# Patient Record
Sex: FEMALE | Race: WHITE | Employment: OTHER | URBAN - METROPOLITAN AREA
[De-identification: names, ages, dates, MRNs, and addresses within clinical notes are randomized per-mention and may not be internally consistent; named-entity substitution may affect disease eponyms.]

---

## 2017-07-08 ENCOUNTER — APPOINTMENT (OUTPATIENT)
Dept: CT IMAGING | Age: 62
End: 2017-07-08
Attending: NURSE PRACTITIONER
Payer: COMMERCIAL

## 2017-07-08 ENCOUNTER — HOSPITAL ENCOUNTER (EMERGENCY)
Age: 62
Discharge: HOME | End: 2017-07-08
Attending: EMERGENCY MEDICINE
Payer: COMMERCIAL

## 2017-07-08 ENCOUNTER — APPOINTMENT (OUTPATIENT)
Dept: GENERAL RADIOLOGY | Age: 62
End: 2017-07-08
Attending: NURSE PRACTITIONER
Payer: COMMERCIAL

## 2017-07-08 VITALS
OXYGEN SATURATION: 99 % | DIASTOLIC BLOOD PRESSURE: 76 MMHG | BODY MASS INDEX: 35.5 KG/M2 | HEART RATE: 102 BPM | HEIGHT: 61 IN | TEMPERATURE: 98.3 F | SYSTOLIC BLOOD PRESSURE: 169 MMHG | RESPIRATION RATE: 18 BRPM | WEIGHT: 188 LBS
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
## 2017-07-08 DIAGNOSIS — S09.90XA HEAD INJURY, INITIAL ENCOUNTER: ICD-10-CM

## 2017-07-08 DIAGNOSIS — M25.522 ELBOW PAIN, LEFT: ICD-10-CM

## 2017-07-08 DIAGNOSIS — S01.81XA LACERATION OF FACE, INITIAL ENCOUNTER: ICD-10-CM

## 2017-07-08 PROCEDURE — 74011250637 HC RX REV CODE- 250/637: Performed by: NURSE PRACTITIONER

## 2017-07-08 PROCEDURE — 70450 CT HEAD/BRAIN W/O DYE: CPT

## 2017-07-08 PROCEDURE — 77030031132 HC SUT NYL COVD -A

## 2017-07-08 PROCEDURE — 75810000293 HC SIMP/SUPERF WND  RPR

## 2017-07-08 PROCEDURE — 74011000250 HC RX REV CODE- 250: Performed by: NURSE PRACTITIONER

## 2017-07-08 PROCEDURE — 73080 X-RAY EXAM OF ELBOW: CPT

## 2017-07-08 PROCEDURE — 77030018836 HC SOL IRR NACL ICUM -A

## 2017-07-08 PROCEDURE — 99284 EMERGENCY DEPT VISIT MOD MDM: CPT

## 2017-07-08 RX ORDER — MUPIROCIN 20 MG/G
OINTMENT TOPICAL 3 TIMES DAILY
Qty: 22 G | Refills: 0 | Status: SHIPPED | OUTPATIENT
Start: 2017-07-08 | End: 2017-07-18

## 2017-07-08 RX ORDER — IBUPROFEN 600 MG/1
600 TABLET ORAL
Status: COMPLETED | OUTPATIENT
Start: 2017-07-08 | End: 2017-07-08

## 2017-07-08 RX ORDER — LIDOCAINE HYDROCHLORIDE AND EPINEPHRINE 10; 10 MG/ML; UG/ML
1.5 INJECTION, SOLUTION INFILTRATION; PERINEURAL
Status: COMPLETED | OUTPATIENT
Start: 2017-07-08 | End: 2017-07-08

## 2017-07-08 RX ORDER — BACITRACIN 500 UNIT/G
1 PACKET (EA) TOPICAL 3 TIMES DAILY
Status: DISCONTINUED | OUTPATIENT
Start: 2017-07-08 | End: 2017-07-08 | Stop reason: HOSPADM

## 2017-07-08 RX ADMIN — IBUPROFEN 600 MG: 600 TABLET, FILM COATED ORAL at 15:56

## 2017-07-08 RX ADMIN — BACITRACIN 1 PACKET: 500 OINTMENT TOPICAL at 15:55

## 2017-07-08 RX ADMIN — LIDOCAINE HYDROCHLORIDE,EPINEPHRINE BITARTRATE 15 MG: 10; .01 INJECTION, SOLUTION INFILTRATION; PERINEURAL at 14:42

## 2017-07-08 NOTE — ED PROVIDER NOTES
HPI Comments: 58 yo F with a hx of DM, hydrocephalus, HTN, IBS (see list)  presents to ED via EMS for evaluation after fall. The pt states she tripped and fell forward, striking the left side of her head while attempting to carry a cat in a carrier and her dog on a leash. Pt states she has a hx of hydrocephalus which causes her balance to be off and falls secondary to balance issues. She denies LOC. Denies dizziness, CP prior to fall. Small laceration to L brow, + L elbow pain. Denies neck, back, hip pain or pain in other extremities. There has been no treatment PTA. Past Medical History:  No date: Adverse effect of anesthesia      Comment: SENSITIVE TO NARCOTICS  No date: Anxiety  No date: Arthritis  No date: Asthma  No date: Chronic pain  No date: Diabetes (HCC)  No date: GERD (gastroesophageal reflux disease)  No date: H pylori ulcer  No date: Hemorrhoids  No date: HSV (herpes simplex virus) anogenital infection      Comment: oral  No date: Hydrocephalus  No date: Hypertension  No date: IBS (irritable bowel syndrome)  No date: Ill-defined condition      Comment: HYDRACEPHALIC, NO SHUNT    Social History    Marital status:              Spouse name:                       Years of education:                 Number of children: 1             Occupational History  Occupation          Employer            Comment               nurse                                       Social History Main Topics    Smoking status: Former Smoker                                                                Packs/day: 0.50      Years: 4.00           Types: Cigarettes       Quit date: 1/1/1975    Smokeless status: Never Used                        Alcohol use: Yes                Comment: 3 PER WEEK    Drug use: No              Sexual activity: Yes               Partners with: Male    Other Topics            Concern    None on file    Social History Narrative    None on file          The history is provided by the patient. No  was used. Past Medical History:   Diagnosis Date    Adverse effect of anesthesia     SENSITIVE TO NARCOTICS    Anxiety     Arthritis     Asthma     Chronic pain     Diabetes ([facility] [address]     GERD (gastroesophageal reflux disease)     H pylori ulcer     Hemorrhoids     HSV (herpes simplex virus) anogenital infection     oral    Hydrocephalus     Hypertension     IBS (irritable bowel syndrome)     Ill-defined condition     HYDRACEPHALIC, NO SHUNT       Past Surgical History:   Procedure Laterality Date    APPENDECTOMY      COLONOSCOPY  2004    repeat in 10 years    DELIVERY       DEXA BONE DENSITY STUDY AXIAL  2009    HX CATARACT REMOVAL      bilateral    HX GYN      HYSTERECTOMY    HX GYN      ECTOPIC    HX GYN      C SECTION    HX HEENT      CRYO SURGERY    HX KNEE ARTHROSCOPY  ;     bilateral    HX TOTAL ABDOMINAL HYSTERECTOMY      PAULINE MAMMOGRAM SCREEN BILAT      TREAT ECTOPIC PREG,ABD PREG  1988         Family History:   Problem Relation Age of Onset    High Cholesterol Mother     Heart Disease Mother      MI age 64    Hypertension Father     Depression Father     Heart Failure Father     Alcohol abuse Father     Heart Disease Father [de-identified]     CHF    Anesth Problems Neg Hx        Social History     Social History    Marital status:      Spouse name: N/A    Number of children: 1    Years of education: N/A     Occupational History    nurse      Social History Main Topics    Smoking status: Former Smoker     Packs/day: 0.50     Years: 4.00     Types: Cigarettes     Quit date: 1975    Smokeless tobacco: Never Used    Alcohol use Yes      Comment: 3 PER WEEK    Drug use: No    Sexual activity: Yes     Partners: Male     Other Topics Concern    Not on file     Social History Narrative         ALLERGIES: Lipitor [atorvastatin];  Parafon forte; Paxil [paroxetine hcl]; and Adhesive    Review of Systems   Constitutional: Negative for activity change, appetite change, chills and fever. HENT: Negative for ear discharge and facial swelling. Eyes: Negative for discharge and redness. Respiratory: Negative for chest tightness, shortness of breath and wheezing. Cardiovascular: Negative for chest pain, palpitations and leg swelling. Gastrointestinal: Negative for abdominal pain, diarrhea, nausea, rectal pain and vomiting. Genitourinary: Negative for difficulty urinating, hematuria and urgency. Musculoskeletal: Negative for back pain, joint swelling, neck pain and neck stiffness. L elbow discomfort   Skin: Positive for wound. Negative for rash. Neurological: Negative for seizures, speech difficulty, weakness and headaches. Psychiatric/Behavioral: Negative for confusion. Vitals:    07/08/17 1323   BP: 169/76   Pulse: (!) 102   Resp: 18   Temp: 98.3 °F (36.8 °C)   SpO2: 99%   Weight: 85.3 kg (188 lb)   Height: 5' 1\" (1.549 m)            Physical Exam   Constitutional: She is oriented to person, place, and time. She appears well-developed and well-nourished. No distress. HENT:   Head: Normocephalic and atraumatic. Eyes: Conjunctivae and EOM are normal. Pupils are equal, round, and reactive to light. Neck: Normal range of motion. Neck supple. Cardiovascular: Normal rate, regular rhythm, normal heart sounds and intact distal pulses. Pulmonary/Chest: Effort normal and breath sounds normal.   Abdominal: Soft. Bowel sounds are normal. She exhibits no distension and no mass. There is no tenderness. There is no rebound and no guarding. Musculoskeletal: Normal range of motion. She exhibits tenderness. She exhibits no edema or deformity. Tenderness to palpation of L elbow. No deformity, decrease in sensation or perfusion noted. +2 RP on L. Cap refill less than 3 seconds. No humeral tenderness or FA tenderness.   No tenderness to L hand or wrist.    Neurological: She is alert and oriented to person, place, and time. No cranial nerve deficit. Coordination normal.   Skin: Skin is warm and dry. 2.5 cm laceration to L brow   Psychiatric: She has a normal mood and affect. Her behavior is normal. Judgment and thought content normal.   Nursing note and vitals reviewed. MDM  Number of Diagnoses or Management Options  Elbow pain, left:   Fall, initial encounter:   Head injury, initial encounter:   Laceration of face, initial encounter:   Diagnosis management comments: 56 yo M presents after losing balance and tripping and falling just PTA today. Pt states she was carrying a cat in a cat carrier in one hand and had her dog on a leash in the other and tripped forward striking her head on the ground. Denies LOC. Denies neck, back, hip pain. Small laceration just over L brow with bleeding controlled by direct pressure. Has hx of hydrocephalus. CT of head ordered. Unsure of Tdap. Tdap ordered. + tenderness to L elbow with no evidence of deformity. Xray of L elbow ordered. Results reviewed. Wound closure by suture using sterile technique. Pt tolerated well. Offered medication for discomfort. Pt remains A & O x 4 with no neurological deficits. Pt agreeable to plan of care and plan for discharge. Discussed hx, presentation and findings with Dr. Irving Stanley who agrees with plan of care and plan for discharge with FU with PCP, return to ED for worsening sx. Reviewed worsening sx with pt who agrees to seek immediate treatment for worsening sx.          Amount and/or Complexity of Data Reviewed  Tests in the radiology section of CPT®: ordered and reviewed  Discuss the patient with other providers: yes (Dr. Irving Stanley )    Patient Progress  Patient progress: stable    ED Course       Wound Repair  Date/Time: 7/8/2017 3:35 PM  Performed by: NPPreparation: skin prepped with alcohol, skin prepped with Shur-Clens and sterile field established  Pre-procedure re-eval: Immediately prior to the procedure, the patient was reevaluated and found suitable for the planned procedure and any planned medications. Time out: Immediately prior to the procedure a time out was called to verify the correct patient, procedure, equipment, staff and marking as appropriate. .  Location details: left eyebrow  Wound length:2.5 cm or less  Anesthesia: local infiltration    Anesthesia:  Anesthesia: local infiltration  Local Anesthetic: lidocaine 1% with epinephrine   Anesthetic total: 1 mL  Foreign bodies: no foreign bodies  Irrigation solution: saline  Irrigation method: syringe  Debridement: none  Skin closure: 6-0 nylon  Number of sutures: 3  Technique: simple and interrupted  Approximation: close  Dressing: antibiotic ointment (nonadherent dressing )  Patient tolerance: Patient tolerated the procedure well with no immediate complications  My total time at bedside, performing this procedure was 1-15 minutes. LABORATORY TESTS:  No results found for this or any previous visit (from the past 12 hour(s)). IMAGING RESULTS:  CT HEAD WO CONT   Final Result      XR ELBOW LT MIN 3 V   Final Result        Results    CT HEAD WO CONT (Accession [id number]) (Order [id number])         Allergies        Unspecified: Lipitor [Atorvastatin]; Parafon Forte;  Paxil [Paroxetine Hcl]     Low: Adhesive       Result Information      Status Provider Status        Final result (Exam End: 7/8/2017  3:07 PM) Open        Study Result      INDICATION: fall, head injury. Fall while walking dog this afternoon. Abrasion  on left for head. Left-sided head pain.     Exam: Noncontrast CT of the brain is performed with 5 mm collimation.     CT dose reduction was achieved with the use of the standardized protocol  tailored for this examination and automatic exposure control for dose  modulation.  Adaptive statistical iterative reconstruction (ASIR) was utilized.     Direct comparison is made to prior CT dated July 2014.     FINDINGS: There is no acute intracranial hemorrhage, mass, mass effect or  herniation. There is stable ventricular dilatation, unchanged compared to prior  CT dated July 2014. There is no evidence of acute territorial infarct. The  mastoid air cells are well pneumatized. The visualized paranasal sinuses are  normal.     IMPRESSION  IMPRESSION: Stable ventriculomegaly. No acute intracranial hemorrhage, mass or  infarct. Results    XR ELBOW LT MIN 3 V (Accession [id number]) (Order [id number])         Allergies        Unspecified: Lipitor [Atorvastatin]; Parafon Forte;  Paxil [Paroxetine Hcl]     Low: Adhesive       Result Information      Status Provider Status        Final result (Exam End: 7/8/2017  2:20 PM) Open        Study Result      EXAM:  XR ELBOW LT MIN 3 V     INDICATION:   Left elbow pain following ground-level fall today.     COMPARISON: None.     FINDINGS: Three views of the left elbow demonstrate no fracture, dislocation,  effusion or other acute abnormality.     IMPRESSION  IMPRESSION: No acute abnormality.            MEDICATIONS GIVEN:  Medications   ibuprofen (MOTRIN) tablet 600 mg (not administered)   bacitracin 500 unit/gram packet 1 Packet (not administered)   lidocaine-EPINEPHrine (XYLOCAINE) 1 %-1:100,000 injection 15 mg (15 mg IntraDERMal Given 7/8/17 1442)       IMPRESSION:  No diagnosis found. PLAN:  1. Current Discharge Medication List        2. Follow-up Information     None        3.  Return to ED if worse

## 2017-07-08 NOTE — DISCHARGE INSTRUCTIONS
Learning About a Closed Head Injury  What is a closed head injury? A closed head injury happens when your head gets hit hard. The strong force of the blow causes your brain to shake in your skull. This movement can cause the brain to bruise, swell, or tear. Sometimes nerves or blood vessels also get damaged. This can cause bleeding in or around the brain. A concussion is a type of closed head injury. What are the symptoms? If you have a mild concussion, you may have a mild headache or feel \"not quite right. \" These symptoms are common. They usually go away over a few days to 4 weeks. But sometimes after a concussion, you feel like you can't function as well as before the injury. And you have new symptoms. This is called postconcussive syndrome. You may:  · Find it harder to solve problems, think, concentrate, or remember. · Have headaches. · Have changes in your sleep patterns, such as not being able to sleep or sleeping all the time. · Have changes in your personality. · Not be interested in your usual activities. · Feel angry or anxious without a clear reason. · Lose your sense of taste or smell. · Be dizzy, lightheaded, or unsteady. It may be hard to stand or walk. How is a closed head injury treated? Any person who may have a concussion needs to see a doctor. Some people have to stay in the hospital to be watched. Others can go home safely. If you go home, follow your doctor's instructions. He or she will tell you if you need someone to watch you closely for the next 24 hours or longer. Rest is the best treatment. Get plenty of sleep at night. And try to rest during the day. · Avoid activities that are physically or mentally demanding. These include housework, exercise, and schoolwork. And don't play video games, send text messages, or use the computer. You may need to change your school or work schedule to be able to avoid these activities.   · Ask your doctor when it's okay to drive, ride a bike, or operate machinery. · Take an over-the-counter pain medicine, such as acetaminophen (Tylenol), ibuprofen (Advil, Motrin), or naproxen (Aleve). Be safe with medicines. Read and follow all instructions on the label. · Check with your doctor before you use any other medicines for pain. · Do not drink alcohol or use illegal drugs. They can slow recovery. They can also increase your risk of getting a second head injury. Follow-up care is a key part of your treatment and safety. Be sure to make and go to all appointments, and call your doctor if you are having problems. It's also a good idea to know your test results and keep a list of the medicines you take. Where can you learn more? Go to http://sona-aime.info/. Enter E235 in the search box to learn more about \"Learning About a Closed Head Injury. \"  Current as of: October 14, 2016  Content Version: 11.3  © 9108-5676 [employer]. Care instructions adapted under license by [employer] (which disclaims liability or warranty for this information). If you have questions about a medical condition or this instruction, always ask your healthcare professional. [place] [address] any warranty or liability for your use of this information. Preventing Falls: Care Instructions  Your Care Instructions  Getting around your home safely can be a challenge if you have injuries or health problems that make it easy for you to fall. Loose rugs and furniture in walkways are among the dangers for many older people who have problems walking or who have poor eyesight. People who have conditions such as arthritis, osteoporosis, or dementia also have to be careful not to fall. You can make your home safer with a few simple measures. Follow-up care is a key part of your treatment and safety. Be sure to make and go to all appointments, and call your doctor if you are having problems.  It's also a good idea to know your test results and keep a list of the medicines you take. How can you care for yourself at home? Taking care of yourself  · You may get dizzy if you do not drink enough water. To prevent dehydration, drink plenty of fluids, enough so that your urine is light yellow or clear like water. Choose water and other caffeine-free clear liquids. If you have kidney, heart, or liver disease and have to limit fluids, talk with your doctor before you increase the amount of fluids you drink. · Exercise regularly to improve your strength, muscle tone, and balance. Walk if you can. Swimming may be a good choice if you cannot walk easily. · Have your vision and hearing checked each year or any time you notice a change. If you have trouble seeing and hearing, you might not be able to avoid objects and could lose your balance. · Know the side effects of the medicines you take. Ask your doctor or pharmacist whether the medicines you take can affect your balance. Sleeping pills or sedatives can affect your balance. · Limit the amount of alcohol you drink. Alcohol can impair your balance and other senses. · Ask your doctor whether calluses or corns on your feet need to be removed. If you wear loose-fitting shoes because of calluses or corns, you can lose your balance and fall. · Talk to your doctor if you have numbness in your feet. Preventing falls at home  · Remove raised doorway thresholds, throw rugs, and clutter. Repair loose carpet or raised areas in the floor. · Move furniture and electrical cords to keep them out of walking paths. · Use nonskid floor wax, and wipe up spills right away, especially on ceramic tile floors. · If you use a walker or cane, put rubber tips on it. If you use crutches, clean the bottoms of them regularly with an abrasive pad, such as steel wool. · Keep your house well lit, especially [place], and outside walkways. Use night-lights in areas such as hallways and bathrooms. Add extra light switches or use remote switches (such as switches that go on or off when you clap your hands) to make it easier to turn lights on if you have to get up during the night. · Install sturdy handrails on stairways. · Move items in your cabinets so that the things you use a lot are on the lower shelves (about waist level). · Keep a cordless phone and a flashlight with new batteries by your bed. If possible, put a phone in each of the main rooms of your house, or carry a cell phone in case you fall and cannot reach a phone. Or, you can wear a device around your neck or wrist. You push a button that sends a signal for help. · Wear low-heeled shoes that fit well and give your feet good support. Use footwear with nonskid soles. Check the heels and soles of your shoes for wear. Repair or replace worn heels or soles. · Do not wear socks without shoes on wood floors. · Walk on the grass when the sidewalks are slippery. If you live in an area that gets snow and ice in the winter, sprinkle salt on slippery steps and sidewalks. Preventing falls in the bath  · Install grab bars and nonskid mats inside and outside your shower or tub and near the toilet and sinks. · Use shower chairs and bath benches. · Use a hand-held shower head that will allow you to sit while showering. · Get into a tub or shower by putting the weaker leg in first. Get out of a tub or shower with your strong side first.  · Repair loose toilet seats and consider installing a raised toilet seat to make getting on and off the toilet easier. · Keep your bathroom door unlocked while you are in the shower. Where can you learn more? Go to http://sona-aime.info/. Enter 0476 [id number] in the search box to learn more about \"Preventing Falls: Care Instructions. \"  Current as of: August 4, 2016  Content Version: 11.3  © 6067-9461 [employer].  Care instructions adapted under license by [employer] (which disclaims liability or warranty for this information). If you have questions about a medical condition or this instruction, always ask your healthcare professional. [address] any warranty or liability for your use of this information. Joint Pain: Care Instructions  Your Care Instructions  Many people have small aches and pains from overuse or injury to muscles and joints. Joint injuries often happen during sports or recreation, work tasks, or projects around the home. An overuse injury can happen when you put too much stress on a joint or when you do an activity that stresses the joint over and over, such as using the computer or rowing a boat. You can take action at home to help your muscles and joints get better. You should feel better in 1 to 2 weeks, but it can take 3 months or more to heal completely. Follow-up care is a key part of your treatment and safety. Be sure to make and go to all appointments, and call your doctor if you are having problems. It's also a good idea to know your test results and keep a list of the medicines you take. How can you care for yourself at home? · Do not put weight on the injured joint for at least a day or two. · For the first day or two after an injury, do not take hot showers or baths, and do not use hot packs. The heat could make swelling worse. · Put ice or a cold pack on the sore joint for 10 to 20 minutes at a time. Try to do this every 1 to 2 hours for the next 3 days (when you are awake) or until the swelling goes down. Put a thin cloth between the ice and your skin. · Wrap the injury in an elastic bandage. Do not wrap it too tightly because this can cause more swelling. · Prop up the sore joint on a pillow when you ice it or anytime you sit or lie down during the next 3 days. Try to keep it above the level of your heart. This will help reduce swelling.   · Take an over-the-counter pain medicine, such as acetaminophen (Tylenol), ibuprofen (Advil, Motrin), or naproxen (Aleve). Read and follow all instructions on the label. · After 1 or 2 days of rest, begin moving the joint gently. While the joint is still healing, you can begin to exercise using activities that do not strain or hurt the painful joint. When should you call for help? Call your doctor now or seek immediate medical care if:  · You have signs of infection, such as:  ¨ Increased pain, swelling, warmth, and redness. ¨ Red streaks leading from the joint. ¨ A fever. Watch closely for changes in your health, and be sure to contact your doctor if:  · Your movement or symptoms are not getting better after 1 to 2 weeks of home treatment. Where can you learn more? Go to http://sona-aime.info/. Enter P205 in the search box to learn more about \"Joint Pain: Care Instructions. \"  Current as of: March 21, 2017  Content Version: 11.3  © 4247-2184 [employer]. Care instructions adapted under license by [employer] (which disclaims liability or warranty for this information). If you have questions about a medical condition or this instruction, always ask your healthcare professional. [place] [address] any warranty or liability for your use of this information. Cuts Closed With Stitches: Care Instructions  Your Care Instructions  A cut can happen anywhere on your body. The doctor used stitches to close the cut. Using stitches also helps the cut heal and reduces scarring. Sometimes pieces of tape called Steri-Strips are put over the stitches. If the cut went deep and through the skin, the doctor may have put in two layers of stitches. The deeper layer brings the deep part of the cut together. These stitches will dissolve and don't need to be removed. The stitches in the upper layer are the ones you see on the cut. You will probably have a bandage over the stitches.  You will need to have the stitches removed, usually in 7 to 14 days. The doctor has checked you carefully, but problems can develop later. If you notice any problems or new symptoms, get medical treatment right away. Follow-up care is a key part of your treatment and safety. Be sure to make and go to all appointments, and call your doctor if you are having problems. It's also a good idea to know your test results and keep a list of the medicines you take. How can you care for yourself at home? · Keep the cut dry for the first 24 to 48 hours. After this, you can shower if your doctor okays it. Pat the cut dry. · Don't soak the cut, such as in a bathtub. Your doctor will tell you when it's safe to get the cut wet. · If your doctor told you how to care for your cut, follow your doctor's instructions. If you did not get instructions, follow this general advice:  ¨ After the first 24 to 48 hours, wash around the cut with clean water 2 times a day. Don't use hydrogen peroxide or alcohol, which can slow healing. ¨ You may cover the cut with a thin layer of petroleum jelly, such as Vaseline, and a nonstick bandage. ¨ Apply more petroleum jelly and replace the bandage as needed. · Prop up the sore area on a pillow anytime you sit or lie down during the next 3 days. Try to keep it above the level of your heart. This will help reduce swelling. · Avoid any activity that could cause your cut to reopen. · Do not remove the stitches on your own. Your doctor will tell you when to come back to have the stitches removed. · Leave Steri-Strips on until they fall off. · Be safe with medicines. Read and follow all instructions on the label. ¨ If the doctor gave you a prescription medicine for pain, take it as prescribed. ¨ If you are not taking a prescription pain medicine, ask your doctor if you can take an over-the-counter medicine. When should you call for help?   Call your doctor now or seek immediate medical care if:  · You have new pain, or your pain gets worse.  · The skin near the cut is cold or pale or changes color. · You have tingling, weakness, or numbness near the cut. · The cut starts to bleed, and blood soaks through the bandage. Oozing small amounts of blood is normal.  · You have trouble moving the area near the cut. · You have symptoms of infection, such as:  ¨ Increased pain, swelling, warmth, or redness around the cut. ¨ Red streaks leading from the cut. ¨ Pus draining from the cut. ¨ A fever. Watch closely for changes in your health, and be sure to contact your doctor if:  · The cut reopens. · You do not get better as expected. Where can you learn more? Go to http://sona-aime.info/. Enter R217 in the search box to learn more about \"Cuts Closed With Stitches: Care Instructions. \"  Current as of: March 20, 2017  Content Version: 11.3  © 2030-1951 [employer]. Care instructions adapted under license by [employer] (which disclaims liability or warranty for this information). If you have questions about a medical condition or this instruction, always ask your healthcare professional. [place] [address] any warranty or liability for your use of this information. We hope that we have addressed all of your medical concerns. The examination and treatment you received in the Emergency Department were for an emergent problem and were not intended as complete care. It is important that you follow up with your healthcare provider(s) for ongoing care. If your symptoms worsen or do not improve as expected, and you are unable to reach your usual health care provider(s), you should return to the Emergency Department. Today's healthcare is undergoing tremendous change, and patient satisfaction surveys are one of the many tools to assess the quality of medical care. You may receive a survey from the [employer] regarding your experience in the Emergency Department.   I hope that your experience has been completely positive, particularly the medical care that I provided. As such, please participate in the survey; anything less than excellent does not meet my expectations or intentions. [address] [facility] and [address] [place] [address] participate in nationally recognized quality of care measures. If your blood pressure is greater than 120/80, as reported below, we urge that you seek medical care to address the potential of high blood pressure, commonly known as hypertension. Hypertension can be hereditary or can be caused by certain medical conditions, pain, stress, or \"white coat syndrome. \"       Please make an appointment with your health care provider(s) for follow up of your Emergency Department visit. VITALS:   Patient Vitals for the past 8 hrs:   Temp Pulse Resp BP SpO2   07/08/17 1323 98.3 °F (36.8 °C) (!) 102 18 169/76 99 %          Thank you for allowing us to provide you with medical care today. We realize that you have many choices for your emergency care needs. Please choose us in the future for any continued health care needs. [facility] Skel[facility], [facility]: [phone]            No results found for this or any previous visit (from the past 24 hour(s)). Xr Elbow Lt Min 3 V    Result Date: 7/8/2017  EXAM:  XR ELBOW LT MIN 3 V INDICATION:   Left elbow pain following ground-level fall today. COMPARISON: None. FINDINGS: Three views of the left elbow demonstrate no fracture, dislocation, effusion or other acute abnormality. IMPRESSION: No acute abnormality. Ct Head Wo Cont    Result Date: 7/8/2017  INDICATION: fall, head injury. Fall while walking dog this afternoon. Abrasion on left for head. Left-sided head pain. Exam: Noncontrast CT of the brain is performed with 5 mm collimation.  CT dose reduction was achieved with the use of the standardized protocol tailored for this examination and automatic exposure control for dose modulation. Adaptive statistical iterative reconstruction (ASIR) was utilized. Direct comparison is made to prior CT dated July 2014. FINDINGS: There is no acute intracranial hemorrhage, mass, mass effect or herniation. There is stable ventricular dilatation, unchanged compared to prior CT dated July 2014. There is no evidence of acute territorial infarct. The mastoid air cells are well pneumatized. The visualized paranasal sinuses are normal.     IMPRESSION: Stable ventriculomegaly. No acute intracranial hemorrhage, mass or infarct.

## 2017-07-08 NOTE — LETTER
NOTIFICATION RETURN TO WORK / SCHOOL 
 
7/8/2017 4:04 PM 
 
Ms. Kaushik Duron [address] [facility] [id number] [facility] [address] To Whom It May Concern: 
 
Kaushik Duron is currently under the care of Caitiejeff Navarro , [facility] DEP. She will return to work/school on: 7-11-17 If there are questions or concerns please have the patient contact our office.  
 
 
 
Sincerely, 
 
 
Alex Bach RN

## 2017-07-08 NOTE — ED TRIAGE NOTES
Pt presents after a trip and fall sustained while walking her dog this afternoon. Pt has abrasion to left forehead. Pt reports pain to left side of head and left elbow.

## 2019-09-08 ENCOUNTER — HOSPITAL ENCOUNTER (OUTPATIENT)
Dept: MRI IMAGING | Age: 64
Discharge: HOME | End: 2019-09-08
Attending: NEUROLOGICAL SURGERY
Payer: COMMERCIAL

## 2019-09-08 DIAGNOSIS — G95.9 MYELOPATHY (HCC): ICD-10-CM

## 2019-09-08 DIAGNOSIS — Q03.9 CONGENITAL HYDROCEPHALUS (HCC): ICD-10-CM

## 2019-09-08 PROCEDURE — 72146 MRI CHEST SPINE W/O DYE: CPT

## 2019-09-08 PROCEDURE — 72141 MRI NECK SPINE W/O DYE: CPT

## 2019-09-08 PROCEDURE — 70551 MRI BRAIN STEM W/O DYE: CPT

## 2020-08-04 ENCOUNTER — HOSPITAL ENCOUNTER (OUTPATIENT)
Dept: CT IMAGING | Age: 65
Discharge: HOME | End: 2020-08-04
Attending: NEUROLOGICAL SURGERY
Payer: COMMERCIAL

## 2020-08-04 ENCOUNTER — APPOINTMENT (OUTPATIENT)
Dept: CT IMAGING | Age: 65
End: 2020-08-04
Attending: NEUROLOGICAL SURGERY
Payer: COMMERCIAL

## 2020-08-04 DIAGNOSIS — R55 SYNCOPAL EPISODES: ICD-10-CM

## 2020-08-04 PROCEDURE — 70450 CT HEAD/BRAIN W/O DYE: CPT

## 2020-08-04 PROCEDURE — 74011636320 HC RX REV CODE- 636/320: Performed by: RADIOLOGY

## 2020-08-04 PROCEDURE — 74011000258 HC RX REV CODE- 258: Performed by: RADIOLOGY

## 2020-08-04 PROCEDURE — 70496 CT ANGIOGRAPHY HEAD: CPT

## 2020-08-04 RX ORDER — SODIUM CHLORIDE 0.9 % (FLUSH) 0.9 %
10 SYRINGE (ML) INJECTION
Status: COMPLETED | OUTPATIENT
Start: 2020-08-04 | End: 2020-08-04

## 2020-08-04 RX ADMIN — Medication 10 ML: at 10:10

## 2020-08-04 RX ADMIN — IOPAMIDOL 100 ML: 755 INJECTION, SOLUTION INTRAVENOUS at 10:09

## 2020-08-04 RX ADMIN — SODIUM CHLORIDE 100 ML: 900 INJECTION, SOLUTION INTRAVENOUS at 10:10

## 2020-08-14 ENCOUNTER — TELEPHONE (OUTPATIENT)
Dept: NEUROLOGY | Facility: CLINIC | Age: 65
End: 2020-08-14

## 2020-08-14 ENCOUNTER — OFFICE VISIT (OUTPATIENT)
Dept: NEUROLOGY | Facility: CLINIC | Age: 65
End: 2020-08-14
Payer: COMMERCIAL

## 2020-08-14 VITALS
DIASTOLIC BLOOD PRESSURE: 90 MMHG | HEART RATE: 84 BPM | HEIGHT: 61 IN | BODY MASS INDEX: 35.5 KG/M2 | SYSTOLIC BLOOD PRESSURE: 150 MMHG | RESPIRATION RATE: 16 BRPM | OXYGEN SATURATION: 97 % | WEIGHT: 188 LBS

## 2020-08-14 DIAGNOSIS — R01.1 MURMUR: ICD-10-CM

## 2020-08-14 DIAGNOSIS — R29.6 FALLS FREQUENTLY: Primary | ICD-10-CM

## 2020-08-14 DIAGNOSIS — R09.89 BRUIT: ICD-10-CM

## 2020-08-14 DIAGNOSIS — R20.0 LOSS OF SENSATION: ICD-10-CM

## 2020-08-14 PROCEDURE — 99205 OFFICE O/P NEW HI 60 MIN: CPT | Performed by: PSYCHIATRY & NEUROLOGY

## 2020-08-14 RX ORDER — FLUTICASONE PROPIONATE 50 MCG
2 SPRAY, SUSPENSION (ML) NASAL DAILY
COMMUNITY
End: 2022-08-16

## 2020-08-14 NOTE — PATIENT INSTRUCTIONS
[facility]   Statement to Patients  April 1, 2014      In an effort to ensure the large volume of patient prescription refills is processed in the most efficient and expeditious manner, we are asking our patients to assist us by calling your Pharmacy for all prescription refills, this will include also your  Mail Order Pharmacy. The pharmacy will contact our office electronically to continue the refill process. Please do not wait until the last minute to call your pharmacy. We need at least 48 hours (2days) to fill prescriptions. We also encourage you to call your pharmacy before going to  your prescription to make sure it is ready. With regard to controlled substance prescription refill requests (narcotic refills) that need to be picked up at our office, we ask your cooperation by providing us with at least 72 hours (3days) notice that you will need a refill. We will not refill narcotic prescription refill requests after 4:00pm on any weekday, Monday through Thursday, or after 2:00pm on Fridays, or on the weekends. We encourage everyone to explore another way of getting your prescription refill request processed using [employer], our patient web portal through our electronic medical record system. [employer] is an efficient and effective way to communicate your medication request directly to the office and  downloadable as an nilay on your smart phone . [employer] also features a review functionality that allows you to view your medication list as well as leave messages for your physician. Are you ready to get connected? If so please review the attatched instructions or speak to any of our staff to get you set up right away! Thank you so much for your cooperation. Should you have any questions please contact our Practice Administrator.     The Physicians and Staff,  [address] [place] [address] Neurology Clinic

## 2020-08-14 NOTE — PROGRESS NOTES
Ms. Yoni Fernandez presents as a new patient for syncope episodes and frequent falls. Patient reported three falls in the past year which resulted in injuries. Depression screening done on patient.

## 2020-08-14 NOTE — PROGRESS NOTES
Neurology Consult Note      HISTORY PROVIDED BY: patient    Chief Complaint:   Chief Complaint   Patient presents with    Neurologic Problem      Subjective:    Stephani Moser is a 76 y.o. right handed female who presents in consultation for falls. She has a h/o congenital hydrocephalus, diagnosed by Dr. Kenia Burris at [address] when she was 43yo, now followed by Dr. Anyi Lyn. She has been falling for the last 5-6 years. The last couple of falls occurred without warning, typically falls forward, only backward once. Last fall was 7/15/20, no warning, did not trip, no dizziness, did not break fall, remembers hitting the ground. Has hit her left face with the last two falls. No LOC. She has noticed in past if she is on uneven ground or gets off balance she will go down. She has done PT. She was seen by Dr. Anyi Lyn who ordered a CTA head and CT head 20 that were stable and unremarkable, stable moderate ventriculomegaly of the lateral and third ventricles with absent septum pellucidum. Has urge incontinence, at times has lost control after waiting to long.       Past Medical History:   Diagnosis Date    Adverse effect of anesthesia     SENSITIVE TO NARCOTICS    Anxiety     Arthritis     Asthma     Congenital hydrocephalus ([address]     Diabetes ([address]     Borderline    GERD (gastroesophageal reflux disease)     H pylori ulcer     Hemorrhoids     HSV-1 (herpes simplex virus 1) infection     oral    Hypertension     IBS (irritable bowel syndrome)     Retinal tear, bilateral       Past Surgical History:   Procedure Laterality Date    APPENDECTOMY      COLONOSCOPY      repeat in 10 years    DEXA BONE DENSITY STUDY AXIAL  2009    HX CATARACT REMOVAL      bilateral    HX  SECTION      HX HEENT      CRYO SURGERY    HX KNEE ARTHROSCOPY  ;     bilateral    HX TOTAL ABDOMINAL HYSTERECTOMY      has right ovary    PAULINE MAMMOGRAM SCREEN BILAT      TREAT ECTOPIC PREG,ABD PREG  1988 Social History     Socioeconomic History    Marital status:      Spouse name: Not on file    Number of children: 1    Years of education: Not on file    Highest education level: Not on file   Occupational History    Occupation: nurse   Social Needs    Financial resource strain: Not on file    Food insecurity     Worry: Not on file     Inability: Not on file   [employer] needs     Medical: Not on file     Non-medical: Not on file   Tobacco Use    Smoking status: Former Smoker     Packs/day: 0.50     Years: 6.00     Pack years: 3.00     Types: Cigarettes     Last attempt to quit: 1975     Years since quittin.6    Smokeless tobacco: Never Used   Substance and Sexual Activity    Alcohol use: Yes     Comment: 3 PER WEEK    Drug use: No    Sexual activity: Yes     Partners: Male   Lifestyle    Physical activity     Days per week: Not on file     Minutes per session: Not on file    Stress: Not on file   Relationships    Social connections     Talks on phone: Not on file     Gets together: Not on file     Attends Muslim service: Not on file     Active member of club or organization: Not on file     Attends meetings of clubs or organizations: Not on file     Relationship status: Not on file    Intimate partner violence     Fear of current or ex partner: Not on file     Emotionally abused: Not on file     Physically abused: Not on file     Forced sexual activity: Not on file   Other Topics Concern    Not on file   Social History Narrative    Lives in [facility].  With son     Family History   Problem Relation Age of Onset    High Cholesterol Mother     Heart Disease Mother         MI age 64, smoker    Hypertension Father     Depression Father     Heart Failure Father     Alcohol abuse Father     Heart Disease Father [de-identified]        CHF    Stroke Father         Dec 90yo    No Known Problems Son     Anesth Problems Neg Hx        Objective:   Review of Systems   Constitutional: Positive for malaise/fatigue. HENT: Positive for hearing loss. Eyes: Negative. Respiratory: Positive for cough and shortness of breath (Asthma). Snoring   Cardiovascular: Positive for palpitations and leg swelling. Gastrointestinal: Positive for constipation. Genitourinary: Negative. Musculoskeletal: Positive for falls, joint pain and myalgias. Skin: Negative. Neurological: Positive for dizziness and weakness. Endo/Heme/Allergies: Negative. Psychiatric/Behavioral: Positive for depression. The patient is nervous/anxious. Allergies   Allergen Reactions    Lipitor [Atorvastatin] Other (comments)     Patient takes simvastatin    Parafon Forte Hives    Paxil [Paroxetine Hcl] Other (comments)     tachycardia    Adhesive Rash        Meds:  Outpatient Medications Prior to Visit   Medication Sig Dispense Refill    hydrochlorothiazide (HYDRODIURIL) 25 mg tablet Take 25 mg by mouth daily.  montelukast (SINGULAIR) 10 mg tablet Take 1 Tab by mouth daily. 30 Tab 2    albuterol (PROVENTIL VENTOLIN) 2.5 mg /3 mL (0.083 %) nebulizer solution 3 mL by Nebulization route every four (4) hours as needed for Wheezing. 1 Package 2    oxyCODONE IR (ROXICODONE) 5 mg immediate release tablet Take 1 Tab by mouth every four (4) hours as needed. Max Daily Amount: 30 mg. Indications: PAIN 100 Tab 0    aspirin delayed-release 325 mg tablet Take 1 Tab by mouth two (2) times a day. 60 Tab 0    traMADol (ULTRAM) 50 mg tablet Take 1 Tab by mouth every six (6) hours as needed. Max Daily Amount: 200 mg. 60 Tab 0    aspirin delayed-release 81 mg tablet Take 81 mg by mouth daily.  metFORMIN (GLUCOPHAGE) 500 mg tablet Take 1,000 mg by mouth daily (with dinner).  omeprazole (PRILOSEC) 20 mg capsule Take 20 mg by mouth nightly.  diclofenac EC (VOLTAREN) 75 mg EC tablet       simvastatin (ZOCOR) 20 mg tablet Take 1 Tab by mouth nightly for 30 days.  30 Tab 0    montelukast (SINGULAIR) 10 mg tablet Take 1 Tab by mouth daily for 90 days. 90 Tab 1    beclomethasone (QVAR) 40 mcg/actuation inhaler Take 1 Puff by inhalation two (2) times a day. 1 Inhaler 5    VITAMIN B COMPLEX (B COMPLEX PO) Take 1 Tab by mouth.  estradiol (VIVELLE-DOT) 0.05 mg/24 hr 1 Patch by TransDERmal route Every Saturday.  valacyclovir (VALTREX) 500 mg tablet Take 500 mg by mouth two (2) times daily as needed. No facility-administered medications prior to visit. Imaging:  MRI Results (most recent):  Results from Hospital Encounter encounter on 09/08/19   MRI CERV SPINE WO CONT    Narrative EXAM: MRI CERV SPINE WO CONT    INDICATION: Myelopathy. G 95.9. Atraumatic chronic imbalance. Multiple falls. Balance issues have worsened over the last several years with increasing  unsteadiness. Also dropping items. COMPARISON: None    TECHNIQUE: MR imaging of the cervical spine was performed using the following  sequences: sagittal T1, T2, STIR;  axial T2, T1.     CONTRAST:  None. FINDINGS:    Cord size and signal appear normal. The visualized portions of the brainstem and  cerebellum appear within normal limits. There is partial visualization of  lateral ventricular enlargement bilaterally which was demonstrated on prior  brain MRI of 10/30/2009 and concurrent MRI of 9/8/2019. There is normal vertebral body height and bone signal. There is straightening of  cervical lordosis although no listhesis. No paraspinal soft tissue mass is  shown. An incidental 9 mm T2 hyperintense lesion is noted in the left thyroid  gland. C2-C3: No herniation or stenosis. C3-C4: Mild disc space narrowing. Right paracentral disc protrusion with  effacement of anterior CSF space and equivocal flattening of underlying  paramedian anterior cord. Small bilateral uncovertebral osteophytes. No  substantial facet arthrosis. Midline AP canal dimension measures 10 mm. Moderate  left and mild right foraminal narrowing.     C4-C5: Mild disc space narrowing. Moderate left and mild right uncovertebral  osteophytes and minimal mild diffuse disc bulging. Mild left facet  osteoarthrosis. No canal stenosis. Mild left foraminal narrowing. C5-C6: Mild disc space narrowing. Mild central disc protrusion. Mild to moderate  right facet osteoarthrosis. AP canal dimension 10 mm. No substantial foraminal  narrowing. C6-C7: Mild disc space narrowing. Mild diffuse disc bulging. Small right  uncovertebral osteophytes. Mild right facet osteoarthrosis. AP canal dimension  11 mm. Mild right foraminal narrowing. C7-T1: No herniation or stenosis. Impression IMPRESSION:    1. Normal cord size and signal.  2. Degenerative findings including C3-4 right paracentral disc protrusion and  C5-6 central disc protrusion. CT Results (most recent):  Results from Hospital Encounter encounter on 08/04/20   CTA HEAD    Narrative EXAM:  CTA HEAD    INDICATION:   Syncopal episodes. COMPARISON:  CT head 8/4/2020, MRI brain 9/8/2019. CONTRAST: 100 mL of Isovue-370. TECHNIQUE:   Unenhanced CT of the head was performed. Following this, multislice helical CT  angiography of the head was performed during uneventful rapid bolus intravenous  administration of contrast. Coronal and sagittal reformations and MIP images and  3D shaded surface display renderings were generated. CT dose reduction was  achieved through use of a standardized protocol tailored for this examination  and automatic exposure control for dose modulation. FINDINGS:  There is no evidence of large vessel occlusion or flow-limiting stenosis of the  intracranial internal carotid, anterior cerebral, and middle cerebral arteries. The anterior communicating artery is not seen. There is no evidence of large vessel occlusion or flow-limiting stenosis of the  intracranial vertebral arteries, basilar artery, or posterior cerebral arteries.   The right posterior communicating artery is patent, the left is not seen.     There is no evidence of aneurysm or vascular malformation. The dural venous  sinuses and deep cerebral venous system are patent. Stable moderate  ventriculomegaly. No evidence of abnormal enhancement on delayed phase images. Impression IMPRESSION:  1. No evidence of significant stenosis or aneurysm.             Reviewed records in [employer] and [employer] tab today    Lab Review   Results for orders placed or performed during the hospital encounter of 20/81/54   METABOLIC PANEL, BASIC   Result Value Ref Range    Sodium 138 136 - 145 mmol/L    Potassium 3.3 (L) 3.5 - 5.1 mmol/L    Chloride 103 97 - 108 mmol/L    CO2 26 21 - 32 mmol/L    Anion gap 9 5 - 15 mmol/L    Glucose 125 (H) 65 - 100 mg/dL    BUN 11 6 - 20 MG/DL    Creatinine 0.62 0.55 - 1.02 MG/DL    BUN/Creatinine ratio 18 12 - 20      GFR est AA >60 >60 ml/min/1.73m2    GFR est non-AA >60 >60 ml/min/1.73m2    Calcium 7.8 (L) 8.5 - 10.1 MG/DL   HEMOGLOBIN   Result Value Ref Range    HGB 8.7 (L) 11.5 - 16.0 g/dL   HEMOGLOBIN   Result Value Ref Range    HGB 9.1 (L) 11.5 - 82.2 g/dL   METABOLIC PANEL, BASIC   Result Value Ref Range    Sodium 141 136 - 145 mmol/L    Potassium 3.8 3.5 - 5.1 mmol/L    Chloride 107 97 - 108 mmol/L    CO2 25 21 - 32 mmol/L    Anion gap 9 5 - 15 mmol/L    Glucose 88 65 - 100 mg/dL    BUN 11 6 - 20 MG/DL    Creatinine 0.60 0.55 - 1.02 MG/DL    BUN/Creatinine ratio 18 12 - 20      GFR est AA >60 >60 ml/min/1.73m2    GFR est non-AA >60 >60 ml/min/1.73m2    Calcium 8.3 (L) 8.5 - 10.1 MG/DL   GLUCOSE, POC   Result Value Ref Range    Glucose (POC) 89 65 - 100 mg/dL    Performed by Eleuterio Willis    GLUCOSE, POC   Result Value Ref Range    Glucose (POC) 119 (H) 65 - 100 mg/dL    Performed by Joseph Market    GLUCOSE, POC   Result Value Ref Range    Glucose (POC) 188 (H) 65 - 100 mg/dL    Performed by Jalil JUAREZ    GLUCOSE, POC   Result Value Ref Range    Glucose (POC) 164 (H) 65 - 100 mg/dL    Performed by Chris Dwyer GLUCOSE, POC   Result Value Ref Range    Glucose (POC) 124 (H) 65 - 100 mg/dL    Performed by [employer]    GLUCOSE, POC   Result Value Ref Range    Glucose (POC) 91 65 - 100 mg/dL    Performed by RYANNE RUSHING    GLUCOSE, POC   Result Value Ref Range    Glucose (POC) 126 (H) 65 - 100 mg/dL    Performed by Aleksander Blankenship         Exam:  Visit Vitals  /90   Pulse 84   Resp 16   Ht 5' 1\" (1.549 m)   Wt 85.3 kg (188 lb)   SpO2 97%   BMI 35.52 kg/m²     General:  Alert, cooperative, no distress. Head:  Normocephalic, without obvious abnormality, atraumatic. Respiratory:  Heart:   Non labored breathing  Regular rate and rhythm, 2/6 murmurs   Neck:   2+ carotids, ?left bruit   Extremities: Warm, no edema, erythematous toes, syndactyly on left, hammer toes   Pulses: 2+ radial pulses. Neurologic:  MS: Alert and oriented x 4, speech intact. Language intact. Attention and fund of knowledge appropriate. Recent and remote memory intact.   Cranial Nerves:  II: visual fields Full to confrontation   II: pupils Equal, round, reactive to light   II: optic disc    III,VII: ptosis none   III,IV,VI: extraocular muscles  EOMI, no nystagmus or diplopia   V: facial light touch sensation  normal   VII: facial muscle function   symmetric   VIII: hearing intact   IX: soft palate elevation  normal   XI: trapezius strength  5/5   XI: sternocleidomastoid strength 5/5   XII: tongue  Midline     Motor: normal bulk and tone, no tremor              Strength: 5/5 throughout, no PD  Sensory: Dec to PP in prox to distal gradient, mod dec vibratory sensation in great toes, position sense intact  Coordination: FTN and HTS intact, GUERLINE intact,Romberg negative  Gait: normal stride, antalgic gait favoring left leg, dec arm swing on left, unsteady with turning, unable to tandem walk  Reflexes: 2+ symmetric, toes downgoing       Assessment/Plan   Pt is a 76 y.o. right handed female with h/o congenital hydrocephalus, diagnosed by Dr. Juhi López at [address] when she was 41yo, now followed by Dr. Thaddeus Bass, stable on last evaluation 8/4/20, with unexplained falls for the last 5-6 years, occurring without warning, no dizziness, notes she does not break her falls and has landed on her face with the last two falls. Has noticed in past if she is on uneven ground or gets off balance she will go down. Exam with 2/6 JONATHAN, possible left carotid bruit, hammer toes, dec sensation to PP in prox to distal gradient in distal LE, mod dec vibratory sensation in great toes, antalgic gait favoring left leg, dec arm swing on left, unsteady with turning, unable to tandem walk. Discussed possible multifactorial gait disturbance and etiologies for falls including possible peripheral neuropathy seen on exam, mechanical issues due to TKR on left and surgery on both, decreased cerebral perfusion is a consideration with murmur and bruit heard on exam, and of course aging and debility could also be contributing. Recommend NCS/EMG to assess for PN, Echo, and CD. F/u after testing is completed. ICD-10-CM ICD-9-CM    1. Falls frequently  R29.6 V15.88 ECHO ADULT COMPLETE      DUPLEX CAROTID BILATERAL      EMG NCV MOTOR WO F/WAVE PER NERVE   2. Murmur  R01.1 785.2 ECHO ADULT COMPLETE   3. Bruit  R09.89 785.9 DUPLEX CAROTID BILATERAL   4. Loss of sensation  R20.0 782. 0 EMG NCV MOTOR WO F/WAVE PER NERVE       Signed:   Sigrid Britt MD  8/14/2020

## 2020-08-14 NOTE — LETTER
8/26/20 Patient: Sb Albarran YOB: 1955 Date of Visit: 8/14/2020 Ezio Brown MD 
[facility] [address] VIA Facsimile: [phone] Dear Ezio Brown MD, Thank you for referring Ms. Donaldo Velasquez to [address] [place] [address] for evaluation. My notes for this consultation are attached. If you have questions, please do not hesitate to call me. I look forward to following your patient along with you. Sincerely, Shan Ratliff MD

## 2020-08-24 ENCOUNTER — HOSPITAL ENCOUNTER (OUTPATIENT)
Dept: NON INVASIVE DIAGNOSTICS | Age: 65
Discharge: HOME | End: 2020-08-24
Attending: PSYCHIATRY & NEUROLOGY
Payer: COMMERCIAL

## 2020-08-24 ENCOUNTER — OFFICE VISIT (OUTPATIENT)
Dept: NEUROLOGY | Facility: CLINIC | Age: 65
End: 2020-08-24
Payer: COMMERCIAL

## 2020-08-24 ENCOUNTER — HOSPITAL ENCOUNTER (OUTPATIENT)
Dept: VASCULAR SURGERY | Age: 65
Discharge: HOME | End: 2020-08-24
Attending: PSYCHIATRY & NEUROLOGY
Payer: COMMERCIAL

## 2020-08-24 VITALS
DIASTOLIC BLOOD PRESSURE: 90 MMHG | WEIGHT: 188 LBS | BODY MASS INDEX: 35.5 KG/M2 | HEIGHT: 61 IN | SYSTOLIC BLOOD PRESSURE: 150 MMHG

## 2020-08-24 VITALS
OXYGEN SATURATION: 97 % | HEIGHT: 61 IN | SYSTOLIC BLOOD PRESSURE: 140 MMHG | WEIGHT: 188 LBS | DIASTOLIC BLOOD PRESSURE: 85 MMHG | HEART RATE: 72 BPM | RESPIRATION RATE: 18 BRPM | BODY MASS INDEX: 35.5 KG/M2

## 2020-08-24 DIAGNOSIS — G62.9 PERIPHERAL POLYNEUROPATHY: ICD-10-CM

## 2020-08-24 DIAGNOSIS — R09.89 BRUIT: ICD-10-CM

## 2020-08-24 DIAGNOSIS — R29.6 FALLS FREQUENTLY: ICD-10-CM

## 2020-08-24 DIAGNOSIS — R01.1 MURMUR: ICD-10-CM

## 2020-08-24 LAB
LEFT CCA DIST DIAS: 21.2 CM/S
LEFT CCA DIST SYS: 79.5 CM/S
LEFT CCA PROX DIAS: 24.5 CM/S
LEFT CCA PROX SYS: 100.4 CM/S
LEFT ECA DIAS: 19 CM/S
LEFT ECA SYS: 106.9 CM/S
LEFT ICA DIST DIAS: 23.4 CM/S
LEFT ICA DIST SYS: 71.7 CM/S
LEFT ICA MID DIAS: 25.6 CM/S
LEFT ICA MID SYS: 84.9 CM/S
LEFT ICA PROX DIAS: 16.8 CM/S
LEFT ICA PROX SYS: 57.5 CM/S
LEFT ICA/CCA SYS: 1.1
LEFT VERTEBRAL DIAS: 14.2 CM/S
LEFT VERTEBRAL SYS: 38.6 CM/S
RIGHT CCA DIST DIAS: 17.7 CM/S
RIGHT CCA DIST SYS: 63.3 CM/S
RIGHT CCA PROX DIAS: 20.3 CM/S
RIGHT CCA PROX SYS: 86.8 CM/S
RIGHT ECA DIAS: 19 CM/S
RIGHT ECA SYS: 120.6 CM/S
RIGHT ICA DIST DIAS: 24.2 CM/S
RIGHT ICA DIST SYS: 76.3 CM/S
RIGHT ICA MID DIAS: 25.5 CM/S
RIGHT ICA MID SYS: 75 CM/S
RIGHT ICA PROX DIAS: 15.1 CM/S
RIGHT ICA PROX SYS: 55.5 CM/S
RIGHT ICA/CCA SYS: 1.2
RIGHT VERTEBRAL DIAS: 10.1 CM/S
RIGHT VERTEBRAL SYS: 39.8 CM/S

## 2020-08-24 PROCEDURE — 95910 NRV CNDJ TEST 7-8 STUDIES: CPT | Performed by: PSYCHIATRY & NEUROLOGY

## 2020-08-24 PROCEDURE — 95886 MUSC TEST DONE W/N TEST COMP: CPT | Performed by: PSYCHIATRY & NEUROLOGY

## 2020-08-24 PROCEDURE — 93880 EXTRACRANIAL BILAT STUDY: CPT

## 2020-08-24 PROCEDURE — 93306 TTE W/DOPPLER COMPLETE: CPT

## 2020-08-24 NOTE — PROGRESS NOTES
[address] [facility]  [address]  Phone [phone] Fax [phone]  Test Date:  2020    Patient: Raymond Rosario : 1955 Physician: Sarah Dwyer MD   Sex: Female Height: 5' 1\" Ref Phys: Nura Meier MD   ID#: [id number] Weight: 188 lbs. Technician: Tomas Quinones .Tynt TECH     Patient Complaints:  BLE    Patient History / Exam:  58-year-old female who is being evaluated for unsteady gait and frequent falls and to rule out peripheral neuropathy as a cause. On examination: Alert and fully oriented. Cranial nerves II through XII intact. Muscle tone and bulk normal.  Strength normal in both upper and lower extremities. DTRs 2/2 symmetric. Ankle jerks hypoactive. Sensation intact to light touch and pinprick. Sensation mildly reduced in the left lower extremity distally compared to the right. Romberg negative. Gait unsteady/antalgic. NCV & EMG Findings:  Evaluation of the left peroneal motor and the left Sup Peroneal sensory nerves showed reduced amplitude (L1.7, L1.5 µV). All remaining nerves  were within normal limits. All left vs. right side differences were within normal limits. All F Wave latencies were within normal limits. All F Wave left vs. right side latency differences were within normal limits. All examined muscles (as indicated in the following table) showed no evidence of electrical instability. Impression:    No convincing evidence to suggest a large fiber peripheral neuropathy. Low peroneal motor and superficial peroneal sensory responses in the left leg may be due to multiple surgeries that she has had on that side. Also, no evidence to suggest a lumbosacral radiculopathy on either side.    Small fiber neuropathy cannot be excluded with this test but it should not cause ataxia.     ___________________________  Sarah Dwyer MD        Nerve Conduction Studies  Anti Sensory Summary Table     Stim Site NR Peak (ms) Norm Peak (ms) P-T Amp (µV) Norm P-T Amp Onset (ms) Site1 Site2 Delta-P (ms) Dist (cm) Wong (m/s) Norm Wong (m/s)   Left Sup Peroneal Anti Sensory (Ant Lat Mall)  32°C   14 cm    2.0 <4.4 1.5 >5.0 1.5 14 cm Ant Lat Mall 2.0 10.0 50 >32   Site 2    2.1  2.3  1.6         Right Sup Peroneal Anti Sensory (Ant Lat Mall)  32.4°C   14 cm    2.1 <4.4 9.1 >5.0 1.8 14 cm Ant Lat Mall 2.1 10.0 48 >32   Site 2    2.2  14.5  1.8         Left Sural Anti Sensory (Lat Mall)  31.6°C   Calf    3.2 <4.0 10.1 >5.0 2.7 Calf Lat Mall 3.2 14.0 44 >35   Site 2    3.2  12.4  2.6         Right Sural Anti Sensory (Lat Mall)  31.9°C   Calf    3.2 <4.0 9.5 >5.0 2.6 Calf Lat Mall 3.2 14.0 44 >35   Site 2    3.2  8.4  2.5           Motor Summary Table     Stim Site NR Onset (ms) Norm Onset (ms) O-P Amp (mV) Norm O-P Amp Site1 Site2 Delta-0 (ms) Dist (cm) Wong (m/s) Norm Wong (m/s)   Left Peroneal Motor (Ext Dig Brev)  30.7°C   Ankle    3.4 <6.1 1.7 >2.5 B Fib Ankle 5.2 28.0 54 >38   B Fib    8.6  1.3  Poplt B Fib 2.0 10.0 50 >40   Poplt    10.6  1.1          Right Peroneal Motor (Ext Dig Brev)  32.1°C   Ankle    4.0 <6.1 2.7 >2.5 B Fib Ankle 5.2 26.0 50 >38   B Fib    9.2  2.2  Poplt B Fib 1.5 10.0 67 >40   Poplt    10.7  2.1          Left Tibial Motor (Abd Melendrez Brev)  31.1°C   Ankle    3.0 <6.1 7.4 >3.0 Knee Ankle 7.1 36.0 51 >35   Knee    10.1  7.0          Right Tibial Motor (Abd Melendrez Brev)  32.4°C   Ankle    3.0 <6.1 5.1 >3.0 Knee Ankle 7.1 34.0 48 >35   Knee    10.1  2.5            F Wave Studies     NR F-Lat (ms) Lat Norm (ms) L-R F-Lat (ms) L-R Lat Norm   Left Tibial (Mrkrs) (Abd Hallucis)  31.4°C      44.38 <61 3.68 <5.7   Right Tibial (Mrkrs) (Abd Hallucis)  32.5°C      48.06 <61 3.68 <5.7     EMG     Side Muscle Nerve Root Ins Act Fibs Psw Amp Dur Poly Recrt Int Pat Comment   Left AntTibialis Dp Br Peronel L4-5 Nml Nml Nml Nml Nml 0 Nml Nml    Left Peroneus Long Sup Br Peronel L5-S1 Nml Nml Nml Nml Nml 0 Nml Nml    Left Gastroc Tibial S1-2 Nml Nml Nml Nml Nml 0 Nml Nml    Left Flex Dig Long Tibial L5-S2 Nml Nml Nml Nml Nml 0 Nml Nml    Left VastusLat Femoral L2-4 Nml Nml Nml Nml Nml 0 Nml Nml    Right AntTibialis Dp Br Peronel L4-5 Nml Nml Nml Nml Nml 0 Nml Nml    Right Peroneus Long Sup Br Peronel L5-S1 Nml Nml Nml Nml Nml 0 Nml Nml    Right Gastroc Tibial S1-2 Nml Nml Nml Nml Nml 0 Nml Nml    Right Flex Dig Long Tibial L5-S2 Nml Nml Nml Nml Nml 0 Nml Nml    Right VastusLat Femoral L2-4 Nml Nml Nml Nml Nml 0 Nml Nml          Waveforms:

## 2020-08-25 LAB
ECHO AO ROOT DIAM: 2.7 CM
ECHO AV AREA PEAK VELOCITY: 2.37 CM2
ECHO AV AREA/BSA PEAK VELOCITY: 1.3 CM2/M2
ECHO AV PEAK GRADIENT: 13.25 MMHG
ECHO AV PEAK VELOCITY: 182.01 CM/S
ECHO LA AREA 4C: 15.74 CM2
ECHO LA VOL 2C: 58.84 ML (ref 22–52)
ECHO LA VOL 4C: 36.01 ML (ref 22–52)
ECHO LA VOL BP: 50.69 ML (ref 22–52)
ECHO LA VOL/BSA BIPLANE: 27.55 ML/M2 (ref 16–28)
ECHO LA VOLUME INDEX A2C: 31.98 ML/M2 (ref 16–28)
ECHO LA VOLUME INDEX A4C: 19.57 ML/M2 (ref 16–28)
ECHO LV INTERNAL DIMENSION DIASTOLIC: 5.14 CM (ref 3.9–5.3)
ECHO LV INTERNAL DIMENSION SYSTOLIC: 3.49 CM
ECHO LV IVSD: 0.86 CM (ref 0.6–0.9)
ECHO LV MASS 2D: 185.4 G (ref 67–162)
ECHO LV MASS INDEX 2D: 100.8 G/M2 (ref 43–95)
ECHO LV POSTERIOR WALL DIASTOLIC: 1.1 CM (ref 0.6–0.9)
ECHO LVOT DIAM: 1.95 CM
ECHO LVOT PEAK GRADIENT: 8.28 MMHG
ECHO LVOT PEAK VELOCITY: 143.84 CM/S
ECHO MV A VELOCITY: 0.01 CM/S
ECHO MV AREA PHT: 2.89 CM2
ECHO MV E DECELERATION TIME (DT): 0.26 S
ECHO MV E VELOCITY: 99.58 CM/S
ECHO MV E/A RATIO: 9958
ECHO MV PRESSURE HALF TIME (PHT): 0.08 S
ECHO PV PEAK INSTANTANEOUS GRADIENT SYSTOLIC: 10.04 MMHG
ECHO RV INTERNAL DIMENSION: 4.56 CM
ECHO RV TAPSE: 1.46 CM (ref 1.5–2)
ECHO TV REGURGITANT MAX VELOCITY: 318.41 CM/S
ECHO TV REGURGITANT PEAK GRADIENT: 40.55 MMHG

## 2020-08-26 NOTE — PROGRESS NOTES
Keenan Ledezma - Please call pt: Echocardiogram 8/24/20 looks great, Carotid dopplers 8/24/20 look great, only mild narrowing of right ICA, and NCS/EMG with Dr. Luis Armando Cain 8/24/20 looks good too, no signs of a peripheral neuropathy, but does have changes in nerves in left lower leg possibly due to prior surgery. We can review all of these together at f/u appt.

## 2020-08-26 NOTE — PROGRESS NOTES
Claudia Sanders - Please call pt: Echocardiogram 8/24/20 looks great, Carotid dopplers 8/24/20 look great, only mild narrowing of right ICA, and NCS/EMG with Dr. Alejo Hoffmann 8/24/20 looks good too, no signs of a peripheral neuropathy, but does have changes in nerves in left lower leg possibly due to prior surgery. We can review all of these together at f/u appt.

## 2020-10-13 ENCOUNTER — OFFICE VISIT (OUTPATIENT)
Dept: NEUROLOGY | Facility: CLINIC | Age: 65
End: 2020-10-13
Payer: COMMERCIAL

## 2020-10-13 VITALS
BODY MASS INDEX: 35.52 KG/M2 | HEART RATE: 72 BPM | OXYGEN SATURATION: 98 % | SYSTOLIC BLOOD PRESSURE: 146 MMHG | RESPIRATION RATE: 18 BRPM | WEIGHT: 188 LBS | DIASTOLIC BLOOD PRESSURE: 88 MMHG

## 2020-10-13 DIAGNOSIS — R29.6 FALLS FREQUENTLY: Primary | ICD-10-CM

## 2020-10-13 PROCEDURE — 99214 OFFICE O/P EST MOD 30 MIN: CPT | Performed by: PSYCHIATRY & NEUROLOGY

## 2020-10-13 NOTE — LETTER
10/21/20 Patient: Katelin Portillo YOB: 1955 Date of Visit: 10/13/2020 Angeles Dalton MD 
[facility] [address] [facility] [address] VIA Facsimile: [phone] Dear Angeles Dalton MD, Thank you for referring Ms. Bryan Stout to [address] [place] [address] for evaluation. My notes for this consultation are attached. If you have questions, please do not hesitate to call me. I look forward to following your patient along with you. Sincerely, Erin Perez MD

## 2020-10-13 NOTE — PATIENT INSTRUCTIONS
[facility]   Statement to Patients  April 1, 2014      In an effort to ensure the large volume of patient prescription refills is processed in the most efficient and expeditious manner, we are asking our patients to assist us by calling your Pharmacy for all prescription refills, this will include also your  Mail Order Pharmacy. The pharmacy will contact our office electronically to continue the refill process. Please do not wait until the last minute to call your pharmacy. We need at least 48 hours (2days) to fill prescriptions. We also encourage you to call your pharmacy before going to  your prescription to make sure it is ready. With regard to controlled substance prescription refill requests (narcotic refills) that need to be picked up at our office, we ask your cooperation by providing us with at least 72 hours (3days) notice that you will need a refill. We will not refill narcotic prescription refill requests after 4:00pm on any weekday, Monday through Thursday, or after 2:00pm on Fridays, or on the weekends. We encourage everyone to explore another way of getting your prescription refill request processed using [employer], our patient web portal through our electronic medical record system. [employer] is an efficient and effective way to communicate your medication request directly to the office and  downloadable as an nilay on your smart phone . [employer] also features a review functionality that allows you to view your medication list as well as leave messages for your physician. Are you ready to get connected? If so please review the attatched instructions or speak to any of our staff to get you set up right away! Thank you so much for your cooperation. Should you have any questions please contact our Practice Administrator.     The Physicians and Staff,  [address] [place] [address] Neurology Clinic

## 2020-10-13 NOTE — PROGRESS NOTES
Neurology Consult Note      HISTORY PROVIDED BY: patient    Chief Complaint:   Chief Complaint   Patient presents with    Results    Fall      Subjective:   Pt is a 76 y.o. right handed female initially and last seen in clinic on 8/14/20 with h/o congenital hydrocephalus, [place]. Magen [place] [id number] when she was 41yo, now followed by Dr. Shaina Dawson, stable on last evaluation 8/4/20, with unexplained falls for the last 5-6 years, occurring without warning, no dizziness, notes she does not break her falls and has landed on her face with the last two falls. Has noticed in past if she is on uneven ground or gets off balance she will go down. Exam with 2/6 JONATHAN, possible left carotid bruit, hammer toes, dec sensation to PP in prox to distal gradient in distal LE, mod dec vibratory sensation in great toes, antalgic gait favoring left leg, dec arm swing on left, unsteady with turning, unable to tandem walk. Discussed possible multifactorial gait disturbance and etiologies for falls including possible peripheral neuropathy seen on exam, mechanical issues due to TKR on left and surgery on both, decreased cerebral perfusion is a consideration with murmur and bruit heard on exam, and of course aging and debility could also be contributing. Recommended NCS/EMG to assess for PN, Echo, and CD. She returns for f/u. NCS/EMG on 8/24/20 with Dr. Ethan Rivera revealed abnormal responses in left leg, likely due to prior surgeries, no PN, no lumbar radiculopathy seen. Echo is unremarkable, CD with <50% stenosis in right ICA, none in left ICA, vertebrals with antegrade flow. Has had one fall since last visit, was walking on carpet to kitchen, feels like her foot may have gotten stuck on carpet, was rushing, fell on right side. Was not dizzy when this occurred. She skinned her elbows and knees, landed on right shoulder. She is doing PT exercises as instructed.      Past Medical History:   Diagnosis Date    Adverse effect of anesthesia     SENSITIVE TO NARCOTICS    Anxiety     Arthritis     Asthma     [facility]en[facility] ([facility] [address]     Diabetes ([facility] [address]     Borderline    GERD (gastroesophageal reflux disease)     H pylori ulcer     Hemorrhoids     HSV-1 (herpes simplex virus 1) infection     oral    Hypertension     IBS (irritable bowel syndrome)     Retinal tear, bilateral       Past Surgical History:   Procedure Laterality Date    APPENDECTOMY      COLONOSCOPY  2004    repeat in 10 years    DEXA BONE DENSITY STUDY AXIAL  2009    HX CATARACT REMOVAL      bilateral    HX  SECTION      HX HEENT      CRYO SURGERY    HX KNEE ARTHROSCOPY  ; 2008    bilateral    HX TOTAL ABDOMINAL HYSTERECTOMY      has right ovary    PAULINE MAMMOGRAM SCREEN BILAT      TREAT ECTOPIC PREG,ABD PREG  1988      Social History     Socioeconomic History    Marital status:      Spouse name: Not on file    Number of children: 1    Years of education: Not on file    Highest education level: Not on file   Occupational History    Occupation: nurse   Social Needs    Financial resource strain: Not on file    Food insecurity     Worry: Not on file     Inability: Not on file   [employer] needs     Medical: Not on file     Non-medical: Not on file   Tobacco Use    Smoking status: Former Smoker     Packs/day: 0.50     Years: 6.00     Pack years: 3.00     Types: Cigarettes     Last attempt to quit: 1975     Years since quittin.8    Smokeless tobacco: Never Used   Substance and Sexual Activity    Alcohol use: Yes     Comment: 3 PER WEEK    Drug use: No    Sexual activity: Yes     Partners: Male   Lifestyle    Physical activity     Days per week: Not on file     Minutes per session: Not on file    Stress: Not on file   Relationships    Social connections     Talks on phone: Not on file     Gets together: Not on file     Attends Buddhist service: Not on file     Active member of club or organization: Not on file     Attends meetings of clubs or organizations: Not on file     Relationship status: Not on file    Intimate partner violence     Fear of current or ex partner: Not on file     Emotionally abused: Not on file     Physically abused: Not on file     Forced sexual activity: Not on file   Other Topics Concern    Not on file   Social History Narrative    L[facility] in [facility]. With son     Family History   Problem Relation Age of Onset    High Cholesterol Mother     Heart Disease Mother         MI age 64, smoker    Hypertension Father     Depression Father     Heart Failure Father     Alcohol abuse Father     Heart Disease Father [de-identified]        CHF    Stroke Father         Dec 90yo    No Known Problems Son     Anesth Problems Neg Hx        Objective:   Review of Systems   Constitutional: Positive for malaise/fatigue. HENT: Positive for hearing loss. Eyes: Negative. Respiratory: Positive for cough and shortness of breath (Asthma). Snoring   Cardiovascular: Positive for palpitations and leg swelling. Gastrointestinal: Positive for constipation. Genitourinary: Negative. Musculoskeletal: Positive for falls, joint pain and myalgias. Skin: Negative. Neurological: Positive for dizziness and weakness. Endo/Heme/Allergies: Negative. Psychiatric/Behavioral: Positive for depression. The patient is nervous/anxious. Allergies   Allergen Reactions    Lipitor [Atorvastatin] Other (comments)     Patient takes simvastatin    Parafon Forte Hives    Paxil [Paroxetine Hcl] Other (comments)     tachycardia    Adhesive Rash        Meds:  Outpatient Medications Prior to Visit   Medication Sig Dispense Refill    fluticasone propionate (Flonase Allergy Relief) 50 mcg/actuation nasal spray 2 Sprays by Both Nostrils route daily.  aspirin delayed-release 81 mg tablet Take 81 mg by mouth daily.  omeprazole (PRILOSEC) 20 mg capsule Take 20 mg by mouth nightly.       hydrochlorothiazide (HYDRODIURIL) 25 mg tablet Take 25 mg by mouth daily.  diclofenac EC (VOLTAREN) 75 mg EC tablet       montelukast (SINGULAIR) 10 mg tablet Take 1 Tab by mouth daily. 30 Tab 2    albuterol (PROVENTIL VENTOLIN) 2.5 mg /3 mL (0.083 %) nebulizer solution 3 mL by Nebulization route every four (4) hours as needed for Wheezing. 1 Package 2    VITAMIN B COMPLEX (B COMPLEX PO) Take 1 Tab by mouth.  valacyclovir (VALTREX) 500 mg tablet Take 500 mg by mouth two (2) times daily as needed.  simvastatin (ZOCOR) 20 mg tablet Take 1 Tab by mouth nightly for 30 days. 30 Tab 0    montelukast (SINGULAIR) 10 mg tablet Take 1 Tab by mouth daily for 90 days. 90 Tab 1     No facility-administered medications prior to visit. Imaging:  MRI Results (most recent):  Results from Hospital Encounter encounter on 09/08/19   MRI CERV SPINE WO CONT    Narrative EXAM: MRI CERV SPINE WO CONT    INDICATION: Myelopathy. G 95.9. Atraumatic chronic imbalance. Multiple falls. Balance issues have worsened over the last several years with increasing  unsteadiness. Also dropping items. COMPARISON: None    TECHNIQUE: MR imaging of the cervical spine was performed using the following  sequences: sagittal T1, T2, STIR;  axial T2, T1.     CONTRAST:  None. FINDINGS:    Cord size and signal appear normal. The visualized portions of the brainstem and  cerebellum appear within normal limits. There is partial visualization of  lateral ventricular enlargement bilaterally which was demonstrated on prior  brain MRI of 10/30/2009 and concurrent MRI of 9/8/2019. There is normal vertebral body height and bone signal. There is straightening of  cervical lordosis although no listhesis. No paraspinal soft tissue mass is  shown. An incidental 9 mm T2 hyperintense lesion is noted in the left thyroid  gland. C2-C3: No herniation or stenosis. C3-C4: Mild disc space narrowing.  Right paracentral disc protrusion with  effacement of anterior CSF space and equivocal flattening of underlying  paramedian anterior cord. Small bilateral uncovertebral osteophytes. No  substantial facet arthrosis. Midline AP canal dimension measures 10 mm. Moderate  left and mild right foraminal narrowing. C4-C5: Mild disc space narrowing. Moderate left and mild right uncovertebral  osteophytes and minimal mild diffuse disc bulging. Mild left facet  osteoarthrosis. No canal stenosis. Mild left foraminal narrowing. C5-C6: Mild disc space narrowing. Mild central disc protrusion. Mild to moderate  right facet osteoarthrosis. AP canal dimension 10 mm. No substantial foraminal  narrowing. C6-C7: Mild disc space narrowing. Mild diffuse disc bulging. Small right  uncovertebral osteophytes. Mild right facet osteoarthrosis. AP canal dimension  11 mm. Mild right foraminal narrowing. C7-T1: No herniation or stenosis. Impression IMPRESSION:    1. Normal cord size and signal.  2. Degenerative findings including C3-4 right paracentral disc protrusion and  C5-6 central disc protrusion. CT Results (most recent):  Results from Hospital Encounter encounter on 08/04/20   CTA HEAD    Narrative EXAM:  CTA HEAD    INDICATION:   Syncopal episodes. COMPARISON:  CT head 8/4/2020, MRI brain 9/8/2019. CONTRAST: 100 mL of Isovue-370. TECHNIQUE:   Unenhanced CT of the head was performed. Following this, multislice helical CT  angiography of the head was performed during uneventful rapid bolus intravenous  administration of contrast. Coronal and sagittal reformations and MIP images and  3D shaded surface display renderings were generated. CT dose reduction was  achieved through use of a standardized protocol tailored for this examination  and automatic exposure control for dose modulation. FINDINGS:  There is no evidence of large vessel occlusion or flow-limiting stenosis of the  intracranial internal carotid, anterior cerebral, and middle cerebral arteries.   The anterior communicating artery is not seen. There is no evidence of large vessel occlusion or flow-limiting stenosis of the  intracranial vertebral arteries, basilar artery, or posterior cerebral arteries. The right posterior communicating artery is patent, the left is not seen. There is no evidence of aneurysm or vascular malformation. The dural venous  sinuses and deep cerebral venous system are patent. Stable moderate  ventriculomegaly. No evidence of abnormal enhancement on delayed phase images. Impression IMPRESSION:  1. No evidence of significant stenosis or aneurysm.             Reviewed records in [employer] and [facility] tab today    Lab Review   Results for orders placed or performed during the hospital encounter of 08/24/20   ECHO ADULT COMPLETE   Result Value Ref Range    IVSd 0.86 0.6 - 0.9 cm    LVIDd 5.14 3.9 - 5.3 cm    LVIDs 3.49 cm    LVOT d 1.95 cm    LVPWd 1.10 (A) 0.6 - 0.9 cm    LVOT Peak Gradient 8.28 mmHg    LVOT Peak Velocity 143.84 cm/s    RVIDd 4.56 cm    LA Volume 50.69 22 - 52 mL    LA Area 4C 15.74 cm2    LA Vol 2C 58.84 (A) 22 - 52 mL    LA Vol 4C 36.01 22 - 52 mL    Aortic Valve Area by Continuity of Peak Velocity 2.37 cm2    AoV PG 13.25 mmHg    Aortic Valve Systolic Peak Velocity 294.92 cm/s    MV A Wong 0.01 cm/s    Mitral Valve E Wave Deceleration Time 0.26 s    MV E Wong 99.58 cm/s    Mitral Valve Pressure Half-time 0.08 s    MVA (PHT) 2.89 cm2    Pulmonic Valve Systolic Peak Instantaneous Gradient 10.04 mmHg    Tapse 1.46 (A) 1.5 - 2.0 cm    Triscuspid Valve Regurgitation Peak Gradient 40.55 mmHg    TR Max Velocity 318.41 cm/s    Ao Root D 2.70 cm    MV E/A 9,958.00     LV Mass .4 67 - 162 g    LV Mass AL Index 100.8 43 - 95 g/m2    LA Vol Index 27.55 16 - 28 ml/m2    LA Vol Index 31.98 16 - 28 ml/m2    LA Vol Index 19.57 16 - 28 ml/m2    BETO/BSA Pk Wong 1.3 cm2/m2        Exam:  Visit Vitals  BP (!) 146/88   Pulse 72   Resp 18   Wt 85.3 kg (188 lb)   SpO2 98%   BMI 35.52 kg/m²     General:  Alert, cooperative, no distress. Head:  Normocephalic, without obvious abnormality, atraumatic. Respiratory:  Heart:   Non labored breathing  Regular rate and rhythm, 2/6 murmurs   Neck:   2+ carotids, ?left bruit   Extremities: Warm, no edema, erythematous toes, syndactyly on left, hammer toes   Pulses: 2+ radial pulses. Neurologic:  MS: Alert and oriented x 4, speech intact. Language intact. Attention and fund of knowledge appropriate. Recent and remote memory intact. Cranial Nerves:  II: visual fields Full to confrontation   II: pupils Equal, round, reactive to light   II: optic disc    III,VII: ptosis none   III,IV,VI: extraocular muscles  EOMI, no nystagmus or diplopia   V: facial light touch sensation  normal   VII: facial muscle function   symmetric   VIII: hearing intact   IX: soft palate elevation  normal   XI: trapezius strength  5/5   XI: sternocleidomastoid strength 5/5   XII: tongue  Midline     Motor: normal bulk and tone, no tremor              Strength: 5/5 throughout, no PD  Sensory: Dec to PP in prox to distal gradient, mod dec vibratory sensation in great toes, position sense intact  Coordination: FTN and HTS intact, GUERLINE intact,Romberg negative  Gait: normal stride, antalgic gait favoring left leg, dec arm swing on left, unsteady with turning, unable to tandem walk  Reflexes: 2+ symmetric, toes downgoing       Assessment/Plan   Pt is a 76 y.o. right handed female with h/o congenital hydrocephalus, [place] Dr. David Chin [place] [id number] [address] when she was 41yo, now followed by Dr. Vika Haynes, stable on last evaluation 8/4/20, with unexplained falls for the last 5-6 years, occurring without warning, no dizziness, notes she does not break her falls and has landed on her face with two falls. Has noticed in past if she is on uneven ground or gets off balance she will go down. No etiology found other than chronic changes in left LE due to prior surgeries.  Echo is unremarkable, CD with <50% stenosis in right ICA, none in left ICA, vertebrals with antegrade flow. Exam 8/14/20 with 2/6 JONATHAN, possible left carotid bruit, hammer toes, dec sensation to PP in prox to distal gradient in distal LE, mod dec vibratory sensation in great toes, antalgic gait favoring left leg, dec arm swing on left, unsteady with turning, unable to tandem walk. Multifactorial gait disturbance and etiologies for falls including mechanical issues due to TKR on left and surgery on both, aging, and debility could also be contributing. Encouraged to contiue PT exercises. F/u in clinic in 8 months to reassess, instructed to call in the interim if needed. ICD-10-CM ICD-9-CM    1. Falls frequently  R29.6 V15.88        Signed:   Checo Willard MD  10/13/2020

## 2021-10-14 ENCOUNTER — OFFICE VISIT (OUTPATIENT)
Dept: NEUROLOGY | Age: 66
End: 2021-10-14
Payer: MEDICARE

## 2021-10-14 VITALS
DIASTOLIC BLOOD PRESSURE: 60 MMHG | HEART RATE: 73 BPM | SYSTOLIC BLOOD PRESSURE: 110 MMHG | WEIGHT: 208.8 LBS | BODY MASS INDEX: 40.99 KG/M2 | OXYGEN SATURATION: 98 % | HEIGHT: 60 IN

## 2021-10-14 DIAGNOSIS — R29.6 FALLS FREQUENTLY: Primary | ICD-10-CM

## 2021-10-14 PROCEDURE — 99213 OFFICE O/P EST LOW 20 MIN: CPT | Performed by: PSYCHIATRY & NEUROLOGY

## 2021-10-14 NOTE — PROGRESS NOTES
Neurology Consult Note      HISTORY PROVIDED BY: patient    Chief Complaint:   Chief Complaint   Patient presents with    Follow-up    Neurologic Problem     congential hydrocephalus and falls      Subjective:   Pt is a 78 y.o. right handed female last seen in clinic on 10/13/20 in f/u with h/o congenital hydrocephalus, diagnosed by Dr. Rayray Menezes at [id number] when she was 43yo, followed by Dr. Yanique Hammonds, stable on last evaluation 8/4/20, with unexplained falls for the last 5-6 years, occurring without warning, no dizziness, notes she does not break her falls and has landed on her face with two falls. Has noticed in past if she is on uneven ground or gets off balance she will go down. No etiology found on NCS/EMG other than chronic changes in left LE due to prior surgeries. Echo was unremarkable, CD with <50% stenosis in right ICA, none in left ICA, vertebrals with antegrade flow. Exam 8/14/20 with 2/6 JONATHAN, possible left carotid bruit, hammer toes, dec sensation to PP in prox to distal gradient in distal LE, mod dec vibratory sensation in great toes, antalgic gait favoring left leg, dec arm swing on left, unsteady with turning, unable to tandem walk. Multifactorial gait disturbance and etiologies for falls including mechanical issues due to TKR on left and surgery on both, aging, and debility could also be contributing. Encouraged to contiue PT exercises. She returns for f/u. Since our last visit, she has had only two falls, one Jan and one Feb, both doing things she should not have been doing around her home, such as rushing to vasu her fighting cats. She injured her right shoulder, most recent PT was due to shoulder. She completed PT for gait, she is continuing to do exercises daily, stretching each morning. She has retired, plans on spending time to focus on her wellbeing. No new complaints, no numbness. No planned f/u with Dr. Yanique Hammonds, told to return if needed.      Previous testing:  -NCS/EMG on 8/24/20 with Dr. Tamara Adam revealed abnormal responses in left leg, likely due to prior surgeries, no PN, no lumbar radiculopathy seen.    -Echo is unremarkable, CD with <50% stenosis in right ICA, none in left ICA, vertebrals with antegrade flow. Past Medical History:   Diagnosis Date    Adverse effect of anesthesia     SENSITIVE TO NARCOTICS    Anxiety     Arthritis     Asthma     Congenital hydrocephalus ([address]     Diabetes ([address]     Borderline    GERD (gastroesophageal reflux disease)     H pylori ulcer     Hemorrhoids     HSV-1 (herpes simplex virus 1) infection     oral    Hypertension     IBS (irritable bowel syndrome)     Retinal tear, bilateral       Past Surgical History:   Procedure Laterality Date    APPENDECTOMY      COLONOSCOPY      repeat in 10 years    DEXA BONE DENSITY STUDY AXIAL  2009    HX CATARACT REMOVAL      bilateral    HX  SECTION      HX HEENT      CRYO SURGERY    HX KNEE ARTHROSCOPY  ;     bilateral    HX TOTAL ABDOMINAL HYSTERECTOMY      has right ovary    PAULINE MAMMOGRAM SCREEN BILAT      TREAT ECTOPIC PREG,ABD PREG  1988      Social History     Socioeconomic History    Marital status:      Spouse name: Not on file    Number of children: 1    Years of education: Not on file    Highest education level: Not on file   Occupational History    Occupation: nurse   Tobacco Use    Smoking status: Former Smoker     Packs/day: 0.50     Years: 6.00     Pack years: 3.00     Types: Cigarettes     Quit date: 1975     Years since quittin.8    Smokeless tobacco: Never Used   Substance and Sexual Activity    Alcohol use: Yes     Comment: 3 PER WEEK    Drug use: No    Sexual activity: Yes     Partners: Male   Other Topics Concern    Not on file   Social History Narrative    Lives in [facility].  With son     Social Determinants of Health     Financial Resource Strain:     Difficulty of Paying Living Expenses:    Food Insecurity:     Worried About Running Out of Food in the Last Year:     Kateryna of Food in the Last Year:    Transportation Needs:     Lack of Transportation (Medical):  Lack of Transportation (Non-Medical):    Physical Activity:     Days of Exercise per Week:     Minutes of Exercise per Session:    Stress:     Feeling of Stress :    Social Connections:     Frequency of Communication with Friends and Family:     Frequency of Social Gatherings with Friends and Family:     Attends Scientologist Services:     Active Member of Clubs or Organizations:     Attends Club or Organization Meetings:     Marital Status:    Intimate Partner Violence:     Fear of Current or Ex-Partner:     Emotionally Abused:     Physically Abused:     Sexually Abused:      Family History   Problem Relation Age of Onset    High Cholesterol Mother     Heart Disease Mother         MI age 64, smoker    Hypertension Father     Depression Father     Heart Failure Father     Alcohol abuse Father     Heart Disease Father [de-identified]        CHF    Stroke Father         Dec 88yo    No Known Problems Son     Anesth Problems Neg Hx        Objective:   Review of Systems   Constitutional: Positive for malaise/fatigue. HENT: Positive for hearing loss. Eyes: Negative. Respiratory: Positive for cough and shortness of breath (Asthma). Snoring   Cardiovascular: Positive for palpitations and leg swelling. Gastrointestinal: Positive for constipation. Genitourinary: Negative. Musculoskeletal: Positive for falls, joint pain and myalgias. Skin: Negative. Neurological: Positive for dizziness and weakness. Endo/Heme/Allergies: Negative. Psychiatric/Behavioral: Positive for depression. The patient is nervous/anxious.         Allergies   Allergen Reactions    Lipitor [Atorvastatin] Other (comments)     Patient takes simvastatin    Parafon Forte Hives    Paxil [Paroxetine Hcl] Other (comments)     tachycardia    Adhesive Rash Meds:  Outpatient Medications Prior to Visit   Medication Sig Dispense Refill    fluticasone propionate (Flonase Allergy Relief) 50 mcg/actuation nasal spray 2 Sprays by Both Nostrils route daily.  aspirin delayed-release 81 mg tablet Take 81 mg by mouth daily.  hydrochlorothiazide (HYDRODIURIL) 25 mg tablet Take 25 mg by mouth daily.  simvastatin (ZOCOR) 20 mg tablet Take 1 Tab by mouth nightly for 30 days. 30 Tab 0    montelukast (SINGULAIR) 10 mg tablet Take 1 Tab by mouth daily. 30 Tab 2    albuterol (PROVENTIL VENTOLIN) 2.5 mg /3 mL (0.083 %) nebulizer solution 3 mL by Nebulization route every four (4) hours as needed for Wheezing. 1 Package 2    VITAMIN B COMPLEX (B COMPLEX PO) Take 1 Tab by mouth.  valacyclovir (VALTREX) 500 mg tablet Take 500 mg by mouth two (2) times daily as needed.  omeprazole (PRILOSEC) 20 mg capsule Take 20 mg by mouth nightly. (Patient not taking: Reported on 10/14/2021)      diclofenac EC (VOLTAREN) 75 mg EC tablet  (Patient not taking: Reported on 10/14/2021)      montelukast (SINGULAIR) 10 mg tablet Take 1 Tab by mouth daily for 90 days. 90 Tab 1     No facility-administered medications prior to visit. Imaging:  MRI Results (most recent):  Results from Hospital Encounter encounter on 09/08/19    MRI CERV SPINE WO CONT    Narrative  EXAM: MRI CERV SPINE WO CONT    INDICATION: Myelopathy. G 95.9. Atraumatic chronic imbalance. Multiple falls. Balance issues have worsened over the last several years with increasing  unsteadiness. Also dropping items. COMPARISON: None    TECHNIQUE: MR imaging of the cervical spine was performed using the following  sequences: sagittal T1, T2, STIR;  axial T2, T1.    CONTRAST:  None. FINDINGS:    Cord size and signal appear normal. The visualized portions of the brainstem and  cerebellum appear within normal limits.  There is partial visualization of  lateral ventricular enlargement bilaterally which was demonstrated on prior  brain MRI of 10/30/2009 and concurrent MRI of 9/8/2019. There is normal vertebral body height and bone signal. There is straightening of  cervical lordosis although no listhesis. No paraspinal soft tissue mass is  shown. An incidental 9 mm T2 hyperintense lesion is noted in the left thyroid  gland. C2-C3: No herniation or stenosis. C3-C4: Mild disc space narrowing. Right paracentral disc protrusion with  effacement of anterior CSF space and equivocal flattening of underlying  paramedian anterior cord. Small bilateral uncovertebral osteophytes. No  substantial facet arthrosis. Midline AP canal dimension measures 10 mm. Moderate  left and mild right foraminal narrowing. C4-C5: Mild disc space narrowing. Moderate left and mild right uncovertebral  osteophytes and minimal mild diffuse disc bulging. Mild left facet  osteoarthrosis. No canal stenosis. Mild left foraminal narrowing. C5-C6: Mild disc space narrowing. Mild central disc protrusion. Mild to moderate  right facet osteoarthrosis. AP canal dimension 10 mm. No substantial foraminal  narrowing. C6-C7: Mild disc space narrowing. Mild diffuse disc bulging. Small right  uncovertebral osteophytes. Mild right facet osteoarthrosis. AP canal dimension  11 mm. Mild right foraminal narrowing. C7-T1: No herniation or stenosis. Impression  IMPRESSION:    1. Normal cord size and signal.  2. Degenerative findings including C3-4 right paracentral disc protrusion and  C5-6 central disc protrusion. CT Results (most recent):  Results from Hospital Encounter encounter on 08/04/20    CTA HEAD    Narrative  EXAM:  CTA HEAD    INDICATION:   Syncopal episodes. COMPARISON:  CT head 8/4/2020, MRI brain 9/8/2019. CONTRAST: 100 mL of Isovue-370. TECHNIQUE:  Unenhanced CT of the head was performed.  Following this, multislice helical CT  angiography of the head was performed during uneventful rapid bolus intravenous  administration of contrast. Coronal and sagittal reformations and MIP images and  3D shaded surface display renderings were generated. CT dose reduction was  achieved through use of a standardized protocol tailored for this examination  and automatic exposure control for dose modulation. FINDINGS:  There is no evidence of large vessel occlusion or flow-limiting stenosis of the  intracranial internal carotid, anterior cerebral, and middle cerebral arteries. The anterior communicating artery is not seen. There is no evidence of large vessel occlusion or flow-limiting stenosis of the  intracranial vertebral arteries, basilar artery, or posterior cerebral arteries. The right posterior communicating artery is patent, the left is not seen. There is no evidence of aneurysm or vascular malformation. The dural venous  sinuses and deep cerebral venous system are patent. Stable moderate  ventriculomegaly. No evidence of abnormal enhancement on delayed phase images. Impression  IMPRESSION:  1. No evidence of significant stenosis or aneurysm.        Reviewed records in tuul and QA on Request today    Lab Review   Results for orders placed or performed during the hospital encounter of 08/24/20   ECHO ADULT COMPLETE   Result Value Ref Range    IVSd 0.86 0.60 - 0.90 cm    LVIDd 5.14 3.90 - 5.30 cm    LVIDs 3.49 cm    LVOT d 1.95 cm    LVPWd 1.10 (A) 0.60 - 0.90 cm    LVOT Peak Gradient 8.28 mmHg    LVOT Peak Velocity 143.84 cm/s    RVIDd 4.56 cm    LA Volume 50.69 22.0 - 52.0 mL    LA Area 4C 15.74 cm2    LA Vol 2C 58.84 (A) 22.00 - 52.00 mL    LA Vol 4C 36.01 22.00 - 52.00 mL    Aortic Valve Area by Continuity of Peak Velocity 2.37 cm2    AoV PG 13.25 mmHg    Aortic Valve Systolic Peak Velocity 437.80 cm/s    MV A Wong 0.01 cm/s    Mitral Valve E Wave Deceleration Time 0.26 s    MV E Wong 99.58 cm/s    Mitral Valve Pressure Half-time 0.08 s    MVA (PHT) 2.89 cm2    Pulmonic Valve Systolic Peak Instantaneous Gradient 10.04 mmHg Tapse 1.46 (A) 1.50 - 2.00 cm    Triscuspid Valve Regurgitation Peak Gradient 40.55 mmHg    TR Max Velocity 318.41 cm/s    Ao Root D 2.70 cm    MV E/A 9,958.00     LV Mass .4 67.0 - 162.0 g    LV Mass AL Index 100.8 43.0 - 95.0 g/m2    LA Vol Index 27.55 16.00 - 28.00 ml/m2    LA Vol Index 31.98 16.00 - 28.00 ml/m2    LA Vol Index 19.57 16.00 - 28.00 ml/m2    BETO/BSA Pk Wong 1.3 cm2/m2        Exam:  Visit Vitals  /60   Pulse 73   Ht 5' (1.524 m)   Wt 208 lb 12.8 oz (94.7 kg)   SpO2 98%   BMI 40.78 kg/m²     General:  Alert, cooperative, no distress. Head:  Normocephalic, without obvious abnormality, atraumatic. Respiratory:  Heart:   Non labored breathing  Regular rate and rhythm, 2/6 murmurs   Neck:   2+ carotids   Extremities: Warm, no edema, erythematous toes, syndactyly on left, hammer toes   Pulses: 2+ radial pulses. Neurologic:  MS: Alert and oriented x 4, speech intact. Language intact. Attention and fund of knowledge appropriate. Recent and remote memory intact.   Cranial Nerves:  II: visual fields Full to confrontation   II: pupils Equal, round, reactive to light   II: optic disc    III,VII: ptosis none   III,IV,VI: extraocular muscles  EOMI, no nystagmus or diplopia   V: facial light touch sensation     VII: facial muscle function   symmetric   VIII: hearing intact   IX: soft palate elevation     XI: trapezius strength     XI: sternocleidomastoid strength    XII: tongue       Motor: normal bulk and tone, no tremor              Strength: 5/5 throughout, no PD  Sensory: Intact PP except left foot - chronic due to surgery, mild to mod dec vibratory sensation in great toes, position sense intact  Coordination: FTN and HTS intact, GUERLINE intact,Romberg negative  Gait: normal stride, improved gait, dec arm swing on left, unable to tandem walk  Reflexes: 2+ symmetric, toes downgoing       Assessment/Plan   Pt is a 78 y.o. right handed female with h/o congenital hydrocephalus, diagnosed by  Toshia at [facility] when she was 39yo, followed by Dr. Claudine Lazaro, stable on last evaluation 8/4/20. Initially presenting in Aug, 2020 for unexplained falls for the last 5-6 years, occurring without warning, no dizziness, notes she does not break her falls and has landed on her face with two falls, high risk on uneven ground or if she gets off balance. No etiology found. Exam is stable to improved with 2/6 JONATHAN, hammer toes, dec sensation to PP in left foot only, mild to mod dec vibratory sensation in great toes, intact position sense, steady gait, unable to tandem walk. Multifactorial gait disturbance and etiologies for falls including mechanical issues due to TKR on left and surgery on both, changes in left foot due to surgery, aging, and debility could also be contributing. Encouraged to contiue PT exercises, working on core muscle strengthening and balance. F/u in neurology if needed. ICD-10-CM ICD-9-CM    1. Falls frequently  R29.6 V15.88        Signed:   Debora Portillo MD  10/14/2021

## 2021-10-14 NOTE — LETTER
10/14/2021    Patient: Roger Mg   YOB: 1955   Date of Visit: 10/14/2021     Duarte Gurrola MD  [address] [place] [id number]  Via Fax: [phone]    Dear Duarte Gurrola MD,      Thank you for referring Ms. aLnie Harp to [address] [place] [address] for evaluation. My notes for this consultation are attached. If you have questions, please do not hesitate to call me. I look forward to following your patient along with you.       Sincerely,    Jasmine Robbins MD

## 2021-12-09 ENCOUNTER — HOSPITAL ENCOUNTER (EMERGENCY)
Age: 66
Discharge: HOME | End: 2021-12-10
Attending: STUDENT IN AN ORGANIZED HEALTH CARE EDUCATION/TRAINING PROGRAM
Payer: MEDICARE

## 2021-12-09 ENCOUNTER — APPOINTMENT (OUTPATIENT)
Dept: CT IMAGING | Age: 66
End: 2021-12-09
Attending: STUDENT IN AN ORGANIZED HEALTH CARE EDUCATION/TRAINING PROGRAM
Payer: MEDICARE

## 2021-12-09 ENCOUNTER — APPOINTMENT (OUTPATIENT)
Dept: GENERAL RADIOLOGY | Age: 66
End: 2021-12-09
Attending: EMERGENCY MEDICINE
Payer: MEDICARE

## 2021-12-09 VITALS
HEART RATE: 115 BPM | OXYGEN SATURATION: 99 % | DIASTOLIC BLOOD PRESSURE: 64 MMHG | RESPIRATION RATE: 20 BRPM | TEMPERATURE: 98 F | SYSTOLIC BLOOD PRESSURE: 149 MMHG

## 2021-12-09 DIAGNOSIS — R00.0 SINUS TACHYCARDIA BY ELECTROCARDIOGRAPHY: ICD-10-CM

## 2021-12-09 DIAGNOSIS — R00.2 PALPITATIONS: Primary | ICD-10-CM

## 2021-12-09 LAB
ALBUMIN SERPL-MCNC: 3.8 G/DL (ref 3.5–5)
ALBUMIN/GLOB SERPL: 0.8 {RATIO} (ref 1.1–2.2)
ALP SERPL-CCNC: 98 U/L (ref 45–117)
ALT SERPL-CCNC: 36 U/L (ref 12–78)
ANION GAP SERPL CALC-SCNC: 9 MMOL/L (ref 5–15)
AST SERPL-CCNC: 36 U/L (ref 15–37)
BASOPHILS # BLD: 0.1 K/UL (ref 0–0.1)
BASOPHILS NFR BLD: 0 % (ref 0–1)
BILIRUB SERPL-MCNC: 0.2 MG/DL (ref 0.2–1)
BUN SERPL-MCNC: 13 MG/DL (ref 6–20)
BUN/CREAT SERPL: 16 (ref 12–20)
CALCIUM SERPL-MCNC: 10.3 MG/DL (ref 8.5–10.1)
CHLORIDE SERPL-SCNC: 103 MMOL/L (ref 97–108)
CO2 SERPL-SCNC: 27 MMOL/L (ref 21–32)
COMMENT, HOLDF: NORMAL
CREAT SERPL-MCNC: 0.81 MG/DL (ref 0.55–1.02)
D DIMER PPP FEU-MCNC: 1.16 MG/L FEU (ref 0–0.65)
DIFFERENTIAL METHOD BLD: ABNORMAL
EOSINOPHIL # BLD: 0 K/UL (ref 0–0.4)
EOSINOPHIL NFR BLD: 0 % (ref 0–7)
ERYTHROCYTE [DISTWIDTH] IN BLOOD BY AUTOMATED COUNT: 15.7 % (ref 11.5–14.5)
GLOBULIN SER CALC-MCNC: 4.5 G/DL (ref 2–4)
GLUCOSE SERPL-MCNC: 106 MG/DL (ref 65–100)
HCT VFR BLD AUTO: 38 % (ref 35–47)
HGB BLD-MCNC: 11.6 G/DL (ref 11.5–16)
IMM GRANULOCYTES # BLD AUTO: 0 K/UL (ref 0–0.04)
IMM GRANULOCYTES NFR BLD AUTO: 0 % (ref 0–0.5)
LYMPHOCYTES # BLD: 1.2 K/UL (ref 0.8–3.5)
LYMPHOCYTES NFR BLD: 10 % (ref 12–49)
MCH RBC QN AUTO: 25.2 PG (ref 26–34)
MCHC RBC AUTO-ENTMCNC: 30.5 G/DL (ref 30–36.5)
MCV RBC AUTO: 82.4 FL (ref 80–99)
MONOCYTES # BLD: 0.6 K/UL (ref 0–1)
MONOCYTES NFR BLD: 5 % (ref 5–13)
NEUTS SEG # BLD: 10.1 K/UL (ref 1.8–8)
NEUTS SEG NFR BLD: 85 % (ref 32–75)
NRBC # BLD: 0 K/UL (ref 0–0.01)
NRBC BLD-RTO: 0 PER 100 WBC
PLATELET # BLD AUTO: 323 K/UL (ref 150–400)
PMV BLD AUTO: 11.2 FL (ref 8.9–12.9)
POTASSIUM SERPL-SCNC: 3.2 MMOL/L (ref 3.5–5.1)
PROT SERPL-MCNC: 8.3 G/DL (ref 6.4–8.2)
RBC # BLD AUTO: 4.61 M/UL (ref 3.8–5.2)
SAMPLES BEING HELD,HOLD: NORMAL
SODIUM SERPL-SCNC: 139 MMOL/L (ref 136–145)
TROPONIN-HIGH SENSITIVITY: 19 NG/L (ref 0–51)
TROPONIN-HIGH SENSITIVITY: 32 NG/L (ref 0–51)
WBC # BLD AUTO: 12.1 K/UL (ref 3.6–11)

## 2021-12-09 PROCEDURE — 84484 ASSAY OF TROPONIN QUANT: CPT

## 2021-12-09 PROCEDURE — 93005 ELECTROCARDIOGRAM TRACING: CPT

## 2021-12-09 PROCEDURE — 74011000636 HC RX REV CODE- 636: Performed by: RADIOLOGY

## 2021-12-09 PROCEDURE — 36415 COLL VENOUS BLD VENIPUNCTURE: CPT

## 2021-12-09 PROCEDURE — 71275 CT ANGIOGRAPHY CHEST: CPT

## 2021-12-09 PROCEDURE — 85379 FIBRIN DEGRADATION QUANT: CPT

## 2021-12-09 PROCEDURE — 85025 COMPLETE CBC W/AUTO DIFF WBC: CPT

## 2021-12-09 PROCEDURE — 80053 COMPREHEN METABOLIC PANEL: CPT

## 2021-12-09 PROCEDURE — 71046 X-RAY EXAM CHEST 2 VIEWS: CPT

## 2021-12-09 PROCEDURE — 99283 EMERGENCY DEPT VISIT LOW MDM: CPT

## 2021-12-09 RX ADMIN — IOPAMIDOL 80 ML: 755 INJECTION, SOLUTION INTRAVENOUS at 22:49

## 2021-12-10 NOTE — ED PROVIDER NOTES
This is a 58-year-old female who presents with complaints of a fast heart rate at home as well as mild shortness of breath. Symptoms started this morning reports she was having increased reflux symptoms after eating. Heart rate was in the 120s to 130s at home. Not resolved at home with rest and relaxation as well as an inhaler and therefore decided to come in and get evaluated. Reports that it is now pretty much resolved while she is in the waiting room approximately 1 hour ago and she feels much better, back to baseline. No recent new medications. She denies any associated shortness of breath, pleurisy, leg pain, fever, cough,, abdominal pain, recent bloody stools, urinary symptoms.            Past Medical History:   Diagnosis Date    Adverse effect of anesthesia     SENSITIVE TO NARCOTICS    Anxiety     Arthritis     Asthma     Congenital hydrocephalus ([address]     Diabetes ([address]     Borderline    GERD (gastroesophageal reflux disease)     H pylori ulcer     Hemorrhoids     HSV-1 (herpes simplex virus 1) infection     oral    Hypertension     IBS (irritable bowel syndrome)     Retinal tear, bilateral        Past Surgical History:   Procedure Laterality Date    COLONOSCOPY      repeat in 10 years    DEXA BONE DENSITY STUDY AXIAL  2009    HX CATARACT REMOVAL      bilateral    HX  SECTION      HX HEENT      CRYO SURGERY    HX KNEE ARTHROSCOPY  ;     bilateral    HX TOTAL ABDOMINAL HYSTERECTOMY      has right ovary    PAULINE MAMMOGRAM SCREEN BILAT      MT APPENDECTOMY      MT TREAT ECTOPIC PREG,ABD PREG           Family History:   Problem Relation Age of Onset    High Cholesterol Mother     Heart Disease Mother         MI age 64, smoker    Hypertension Father     Depression Father     Heart Failure Father     Alcohol abuse Father     Heart Disease Father [de-identified]        CHF    Stroke Father         Dec 90yo    No Known Problems Son     Anesth Problems Neg Hx Social History     Socioeconomic History    Marital status:      Spouse name: Not on file    Number of children: 1    Years of education: Not on file    Highest education level: Not on file   Occupational History    Occupation: nurse   Tobacco Use    Smoking status: Former Smoker     Packs/day: 0.50     Years: 6.00     Pack years: 3.00     Types: Cigarettes     Quit date: 1975     Years since quittin.9    Smokeless tobacco: Never Used   Substance and Sexual Activity    Alcohol use: Yes     Comment: 3 PER WEEK    Drug use: No    Sexual activity: Yes     Partners: Male   Other Topics Concern    Not on file   Social History Narrative    Lives in [facility]. With son     Social Determinants of Health     Financial Resource Strain:     Difficulty of Paying Living Expenses: Not on file   Food Insecurity:     Worried About Running Out of Food in the Last Year: Not on file    Kateryna of Food in the Last Year: Not on file   Transportation Needs:     Lack of Transportation (Medical): Not on file    Lack of Transportation (Non-Medical):  Not on file   Physical Activity:     Days of Exercise per Week: Not on file    Minutes of Exercise per Session: Not on file   Stress:     Feeling of Stress : Not on file   Social Connections:     Frequency of Communication with Friends and Family: Not on file    Frequency of Social Gatherings with Friends and Family: Not on file    Attends Congregation Services: Not on file    Active Member of Clubs or Organizations: Not on file    Attends Club or Organization Meetings: Not on file    Marital Status: Not on file   Intimate Partner Violence:     Fear of Current or Ex-Partner: Not on file    Emotionally Abused: Not on file    Physically Abused: Not on file    Sexually Abused: Not on file   Housing Stability:     Unable to Pay for Housing in the Last Year: Not on file    Number of [place] in the Last Year: Not on file    Unstable Housing in the Last Year: Not on file         ALLERGIES: Lipitor [atorvastatin], Parafon forte, Paxil [paroxetine hcl], and Adhesive    Review of Systems   Constitutional: Negative for fever. Respiratory: Positive for shortness of breath (see HPI). Negative for cough. Cardiovascular: Positive for palpitations (see HPI). Negative for chest pain. Gastrointestinal: Negative for abdominal pain, blood in stool, diarrhea and vomiting. Genitourinary: Negative for dysuria. Musculoskeletal: Negative for back pain. Neurological: Negative for syncope. All other systems reviewed and are negative. Vitals:    12/09/21 1543   BP: (!) 149/64   Pulse: (!) 115   Resp: 20   Temp: 98 °F (36.7 °C)   SpO2: 99%            Physical Exam  Vitals and nursing note reviewed. Constitutional:       General: She is not in acute distress. Appearance: She is well-developed. HENT:      Head: Normocephalic and atraumatic. Eyes:      Conjunctiva/sclera: Conjunctivae normal.   Cardiovascular:      Rate and Rhythm: Normal rate and regular rhythm. Pulses: Normal pulses. Comments: Radial pulses equal b/l  Pulmonary:      Effort: Pulmonary effort is normal. No respiratory distress. Abdominal:      Palpations: Abdomen is soft. Tenderness: There is no abdominal tenderness. There is no guarding. Musculoskeletal:         General: Normal range of motion. Cervical back: Normal range of motion and neck supple. Right lower leg: No tenderness. No edema. Left lower leg: No tenderness. No edema. Skin:     General: Skin is warm and dry. Neurological:      Mental Status: She is alert and oriented to person, place, and time. Motor: No abnormal muscle tone. Gait: Gait is intact. MDM       Procedures    EKG interpretation: 15:52  Rhythm: sinus tachycardia with a RBBB; and regular . Rate (approx.): 103;  Axis: normal; Intervals: QTc 487 ms; ST/T wave: non-specific findings, no STEMI; EKG documented and interpreted by Bree Garsia MD, ED MD.    EKG interpretation: 20:33  Rhythm: normal sinus rhythm with a RBBB; and regular . Rate (approx.): 70; Axis: normal; Intervals: QTc 468 ms; ST/T wave: no STEMI; EKG documented and interpreted by Bree Garsia MD, ED MD.         11:10 PM  Change of shift. Care of patient signed over to Dr. Lauren Larson. Handoff complete.

## 2021-12-12 LAB
ATRIAL RATE: 103 BPM
ATRIAL RATE: 70 BPM
CALCULATED P AXIS, ECG09: 64 DEGREES
CALCULATED P AXIS, ECG09: 65 DEGREES
CALCULATED R AXIS, ECG10: 57 DEGREES
CALCULATED R AXIS, ECG10: 66 DEGREES
CALCULATED T AXIS, ECG11: 19 DEGREES
CALCULATED T AXIS, ECG11: 2 DEGREES
DIAGNOSIS, 93000: NORMAL
DIAGNOSIS, 93000: NORMAL
P-R INTERVAL, ECG05: 180 MS
P-R INTERVAL, ECG05: 188 MS
Q-T INTERVAL, ECG07: 372 MS
Q-T INTERVAL, ECG07: 434 MS
QRS DURATION, ECG06: 128 MS
QRS DURATION, ECG06: 136 MS
QTC CALCULATION (BEZET), ECG08: 468 MS
QTC CALCULATION (BEZET), ECG08: 487 MS
VENTRICULAR RATE, ECG03: 103 BPM
VENTRICULAR RATE, ECG03: 70 BPM

## 2022-08-16 RX ORDER — SIMVASTATIN 20 MG/1
20 TABLET, FILM COATED ORAL
COMMUNITY

## 2022-08-23 ENCOUNTER — ANESTHESIA (OUTPATIENT)
Dept: ENDOSCOPY | Age: 67
End: 2022-08-23
Payer: MEDICARE

## 2022-08-23 ENCOUNTER — HOSPITAL ENCOUNTER (OUTPATIENT)
Age: 67
Setting detail: OUTPATIENT SURGERY
Discharge: HOME | End: 2022-08-23
Attending: INTERNAL MEDICINE | Admitting: INTERNAL MEDICINE
Payer: MEDICARE

## 2022-08-23 ENCOUNTER — ANESTHESIA EVENT (OUTPATIENT)
Dept: ENDOSCOPY | Age: 67
End: 2022-08-23
Payer: MEDICARE

## 2022-08-23 VITALS
HEIGHT: 61 IN | SYSTOLIC BLOOD PRESSURE: 113 MMHG | TEMPERATURE: 98 F | WEIGHT: 196 LBS | DIASTOLIC BLOOD PRESSURE: 73 MMHG | RESPIRATION RATE: 17 BRPM | HEART RATE: 71 BPM | BODY MASS INDEX: 37 KG/M2 | OXYGEN SATURATION: 99 %

## 2022-08-23 PROCEDURE — 76060000031 HC ANESTHESIA FIRST 0.5 HR: Performed by: INTERNAL MEDICINE

## 2022-08-23 PROCEDURE — 77030021593 HC FCPS BIOP ENDOSC BSC -A: Performed by: INTERNAL MEDICINE

## 2022-08-23 PROCEDURE — 74011250636 HC RX REV CODE- 250/636: Performed by: NURSE ANESTHETIST, CERTIFIED REGISTERED

## 2022-08-23 PROCEDURE — 88305 TISSUE EXAM BY PATHOLOGIST: CPT

## 2022-08-23 PROCEDURE — 74011000250 HC RX REV CODE- 250: Performed by: NURSE ANESTHETIST, CERTIFIED REGISTERED

## 2022-08-23 PROCEDURE — 76040000019: Performed by: INTERNAL MEDICINE

## 2022-08-23 PROCEDURE — 88342 IMHCHEM/IMCYTCHM 1ST ANTB: CPT

## 2022-08-23 PROCEDURE — 77030020268 HC MISC GENERAL SUPPLY: Performed by: INTERNAL MEDICINE

## 2022-08-23 PROCEDURE — 2709999900 HC NON-CHARGEABLE SUPPLY: Performed by: INTERNAL MEDICINE

## 2022-08-23 RX ORDER — LIDOCAINE HYDROCHLORIDE 20 MG/ML
INJECTION, SOLUTION EPIDURAL; INFILTRATION; INTRACAUDAL; PERINEURAL AS NEEDED
Status: DISCONTINUED | OUTPATIENT
Start: 2022-08-23 | End: 2022-08-23 | Stop reason: HOSPADM

## 2022-08-23 RX ORDER — SODIUM CHLORIDE 0.9 % (FLUSH) 0.9 %
5-40 SYRINGE (ML) INJECTION EVERY 8 HOURS
Status: DISCONTINUED | OUTPATIENT
Start: 2022-08-23 | End: 2022-08-23 | Stop reason: HOSPADM

## 2022-08-23 RX ORDER — ONDANSETRON 2 MG/ML
INJECTION INTRAMUSCULAR; INTRAVENOUS AS NEEDED
Status: DISCONTINUED | OUTPATIENT
Start: 2022-08-23 | End: 2022-08-23 | Stop reason: HOSPADM

## 2022-08-23 RX ORDER — EPINEPHRINE 0.1 MG/ML
1 INJECTION INTRACARDIAC; INTRAVENOUS
Status: DISCONTINUED | OUTPATIENT
Start: 2022-08-23 | End: 2022-08-23 | Stop reason: HOSPADM

## 2022-08-23 RX ORDER — ATROPINE SULFATE 0.1 MG/ML
0.5 INJECTION INTRAVENOUS
Status: DISCONTINUED | OUTPATIENT
Start: 2022-08-23 | End: 2022-08-23 | Stop reason: HOSPADM

## 2022-08-23 RX ORDER — SODIUM CHLORIDE 9 MG/ML
INJECTION, SOLUTION INTRAVENOUS
Status: DISCONTINUED | OUTPATIENT
Start: 2022-08-23 | End: 2022-08-23 | Stop reason: HOSPADM

## 2022-08-23 RX ORDER — NALOXONE HYDROCHLORIDE 0.4 MG/ML
0.4 INJECTION, SOLUTION INTRAMUSCULAR; INTRAVENOUS; SUBCUTANEOUS
Status: DISCONTINUED | OUTPATIENT
Start: 2022-08-23 | End: 2022-08-23 | Stop reason: HOSPADM

## 2022-08-23 RX ORDER — SODIUM CHLORIDE 0.9 % (FLUSH) 0.9 %
5-40 SYRINGE (ML) INJECTION AS NEEDED
Status: DISCONTINUED | OUTPATIENT
Start: 2022-08-23 | End: 2022-08-23 | Stop reason: HOSPADM

## 2022-08-23 RX ORDER — GLYCOPYRROLATE 0.2 MG/ML
INJECTION INTRAMUSCULAR; INTRAVENOUS AS NEEDED
Status: DISCONTINUED | OUTPATIENT
Start: 2022-08-23 | End: 2022-08-23 | Stop reason: HOSPADM

## 2022-08-23 RX ORDER — DEXTROMETHORPHAN/PSEUDOEPHED 2.5-7.5/.8
1.2 DROPS ORAL
Status: DISCONTINUED | OUTPATIENT
Start: 2022-08-23 | End: 2022-08-23 | Stop reason: HOSPADM

## 2022-08-23 RX ORDER — SODIUM CHLORIDE 9 MG/ML
50 INJECTION, SOLUTION INTRAVENOUS CONTINUOUS
Status: DISCONTINUED | OUTPATIENT
Start: 2022-08-23 | End: 2022-08-23 | Stop reason: HOSPADM

## 2022-08-23 RX ORDER — FLUMAZENIL 0.1 MG/ML
0.2 INJECTION INTRAVENOUS
Status: DISCONTINUED | OUTPATIENT
Start: 2022-08-23 | End: 2022-08-23 | Stop reason: HOSPADM

## 2022-08-23 RX ORDER — PROPOFOL 10 MG/ML
INJECTION, EMULSION INTRAVENOUS AS NEEDED
Status: DISCONTINUED | OUTPATIENT
Start: 2022-08-23 | End: 2022-08-23 | Stop reason: HOSPADM

## 2022-08-23 RX ADMIN — PROPOFOL 50 MG: 10 INJECTION, EMULSION INTRAVENOUS at 13:44

## 2022-08-23 RX ADMIN — ONDANSETRON HYDROCHLORIDE 4 MG: 2 INJECTION, SOLUTION INTRAMUSCULAR; INTRAVENOUS at 13:46

## 2022-08-23 RX ADMIN — PROPOFOL 50 MG: 10 INJECTION, EMULSION INTRAVENOUS at 13:50

## 2022-08-23 RX ADMIN — PROPOFOL 50 MG: 10 INJECTION, EMULSION INTRAVENOUS at 13:47

## 2022-08-23 RX ADMIN — PROPOFOL 50 MG: 10 INJECTION, EMULSION INTRAVENOUS at 13:53

## 2022-08-23 RX ADMIN — SODIUM CHLORIDE: 900 INJECTION, SOLUTION INTRAVENOUS at 13:32

## 2022-08-23 RX ADMIN — GLYCOPYRROLATE 0.2 MG: 0.2 INJECTION, SOLUTION INTRAMUSCULAR; INTRAVENOUS at 13:57

## 2022-08-23 RX ADMIN — PROPOFOL 50 MG: 10 INJECTION, EMULSION INTRAVENOUS at 13:56

## 2022-08-23 RX ADMIN — GLYCOPYRROLATE 0.2 MG: 0.2 INJECTION, SOLUTION INTRAMUSCULAR; INTRAVENOUS at 13:54

## 2022-08-23 RX ADMIN — PROPOFOL 50 MG: 10 INJECTION, EMULSION INTRAVENOUS at 13:41

## 2022-08-23 RX ADMIN — LIDOCAINE HYDROCHLORIDE 40 MG: 20 INJECTION, SOLUTION EPIDURAL; INFILTRATION; INTRACAUDAL; PERINEURAL at 13:39

## 2022-08-23 RX ADMIN — PROPOFOL 100 MG: 10 INJECTION, EMULSION INTRAVENOUS at 13:39

## 2022-08-23 NOTE — H&P
Pre-Endoscopy H&P   Chief complaint: JUNG    HPI:  Patient presents for procedure. The indication for the procedure, the patient's history and the patient's current medications are reviewed prior to the procedure and that data is reported on the endoscopy note in the chart to which this document is attached. Any significant complaints with regard to organ systems will be noted, and if not mentioned then a review of systems is not contributory.     Past Medical History:   Diagnosis Date    Adverse effect of anesthesia     SENSITIVE TO NARCOTICS/mediations/a little goes a long way    Adverse effect of anesthesia     aspiration with total knee replacement - CXR done and was fine    Anxiety     Arrhythmia     heart murmur    Arthritis     Asthma     Congenital hydrocephalus (HCC)     Diabetes (HCC)     Borderline    GERD (gastroesophageal reflux disease)     H pylori ulcer     Hemorrhoids     HSV-1 (herpes simplex virus 1) infection     oral    Hypertension     IBS (irritable bowel syndrome)     Retinal tear, bilateral       Past Surgical History:   Procedure Laterality Date    COLONOSCOPY      repeat in 10 years    DEXA BONE DENSITY STUDY AXIAL  2009    HX CATARACT REMOVAL      bilateral    HX  SECTION      HX HEENT      CRYO SURGERY    HX HEENT Bilateral     for retinal tear    HX KNEE ARTHROSCOPY  ;     bilateral    HX KNEE REPLACEMENT Left     total knee    HX TOTAL ABDOMINAL HYSTERECTOMY      has right ovary    PAULINE MAMMOGRAM SCREEN BILAT  2011    CO APPENDECTOMY      CO TREAT ECTOPIC PREG,ABD PREG  1988     Social   Social History     Tobacco Use    Smoking status: Former     Packs/day: 0.50     Years: 6.00     Pack years: 3.00     Types: Cigarettes     Quit date: 1975     Years since quittin.6    Smokeless tobacco: Never   Substance Use Topics    Alcohol use: Yes     Comment: 3 PER WEEK      Family History   Problem Relation Age of Onset    High Cholesterol Mother     Heart Disease Mother         MI age 64, smoker    Hypertension Father     Depression Father     Heart Failure Father     Alcohol abuse Father     Heart Disease Father [de-identified]        CHF    Stroke Father         Dec 88yo    No Known Problems Son     Anesth Problems Neg Hx       Allergies   Allergen Reactions    Lipitor [Atorvastatin] Other (comments)     Patient takes simvastatin    Parafon Forte Hives    Paxil [Paroxetine Hcl] Other (comments)     tachycardia    Adhesive Rash      Prior to Admission Medications   Prescriptions Last Dose Informant Patient Reported? Taking? ALBUTEROL IN Not Taking  Yes No   Sig: Take  by inhalation as needed. Patient not taking: Reported on 2022   Lactobacillus acidophilus (PROBIOTIC PO) 2022  Yes Yes   Sig: Take  by mouth. Chews 2 gummies po once daily. MULTIVITAMIN PO 2022  Yes Yes   Sig: Take  by mouth. Chews 2 gummies po once daily. OMEPRAZOLE PO 2022  Yes Yes   Sig: Take  by mouth as needed. OTC   albuterol (PROVENTIL VENTOLIN) 2.5 mg /3 mL (0.083 %) nebulizer solution Not Taking  No No   Sig: 3 mL by Nebulization route every four (4) hours as needed for Wheezing. Patient not taking: Reported on 2022   cholecalciferol, vitamin D3, (VITAMIN D3 PO) 2022  Yes Yes   Sig: Take 2,000 Units by mouth daily. gummies   fluticasone propionate (FLONASE NA)   Yes Yes   [place] by Both Nostrils route two (2) times a day. hydrochlorothiazide (HYDRODIURIL) 25 mg tablet 2022  Yes Yes   Sig: Take 25 mg by mouth daily. montelukast (SINGULAIR) 10 mg tablet 2022  No Yes   Sig: Take 1 Tab by mouth daily. montelukast (SINGULAIR) 10 mg tablet   No No   Sig: Take 1 Tab by mouth daily for 90 days. simvastatin (ZOCOR) 20 mg tablet   No No   Sig: Take 1 Tab by mouth nightly for 30 days. simvastatin (ZOCOR) 20 mg tablet 2022  Yes Yes   Sig: Take 20 mg by mouth nightly.    valACYclovir (VALTREX) 500 mg tablet   Yes Yes   Sig: Take 500 mg by mouth two (2) times daily as needed. Facility-Administered Medications: None       PHYSICAL EXAM:  The patient is examined immediately prior to the procedure. Visit Vitals  Ht 5' 0.5\" (1.537 m)   Wt 88.9 kg (196 lb)   Breastfeeding No   BMI 37.65 kg/m²     Gen: Appears comfortable, no distress. Pulm: comfortable respirations with no abnormal audible breath sounds  CV: heart regular, well perfused  GI: abdomen flat. ASSESSMENT:  Patient here for procedure. The indication for the procedure, the patient's history and the patient's current medications are reviewed prior to the procedure and that data is reported on the endoscopy note in the chart to which this document is attached. PLAN:  Informed consent discussion held, patient afforded an opportunity to ask questions and all questions answered. After being advised of the risks, benefits, and alternatives, the patient requested that we proceed and indicated so on a written consent form. Will proceed with procedure as planned.   Logan Don MD

## 2022-08-23 NOTE — PROGRESS NOTES
Roger Reading  1955  [id number]    Situation:  Verbal report received from: Clarisa Mccarthy RN  Procedure: Procedure(s):  COLONOSCOPY  ESOPHAGOGASTRODUODENOSCOPY (EGD)  ESOPHAGOGASTRODUODENAL (EGD) BIOPSY  ESOPHAGEAL DILATION    Background:    Preoperative diagnosis: Anemia, unspecified type [D64.9]  Hemorrhage of rectum and anus [K62.5]  Postoperative diagnosis: Normal    :  Dr. Eleonora Pandya  Assistant(s): Endoscopy Technician-1: Shimon Mart  Endoscopy RN-1: Justin Whitlock RN    Specimens:   ID Type Source Tests Collected by Time Destination   1 : Duodenum Bx Preservative Duodenum  Brennen Snow MD 8/23/2022 1342 Pathology   2 : Gastric Bx Preservative Gastric  Brennen Snow MD 8/23/2022 1343 Pathology     H. Pylori  no    Assessment:  Intra-procedure medications   Anesthesia gave intra-procedure sedation and medications, see anesthesia flow sheet yes    Intravenous fluids: NS@ KVO     Vital signs stable     Abdominal assessment: round and soft     Recommendation:  Discharge patient per MD order.   Family or Friend -son  Permission to share finding with family or friend yes

## 2022-08-23 NOTE — PROGRESS NOTES
Initial RN admission and assessment performed and documented in Endoscopy navigator. Patient evaluated by anesthesia in pre-procedure holding. All procedural vital signs, airway assessment, and level of consciousness information monitored and recorded by anesthesia staff on the anesthesia record. Report received from CRNA post procedure. Patient transported to recovery area by RN. Endoscope was pre-cleaned at bedside immediately following procedure by Thor Medrano. Specimens verified w/ tech & Md post procedure.

## 2022-08-23 NOTE — PROCEDURES
[facility].  [address] [facility] [address], [address]  [phone]                           Colonoscopy and EGD Procedure Note      Indications:    Iron deficiency anemia     :  Mikaela Littlejohn MD    Staff: Endoscopy Technician-1: Mark Anthony Beaumont  Endoscopy RN-1: Marge Yepez RN    Referring Provider: Junito Colorado MD    Sedation:  MAC    Procedure Details:  After informed consent was obtained with all risks and benefits of procedure explained and pre-operative exam completed, pt was placed in the left lateral decubitus position. Following sequential administration of sedation as per above, the gastroscope was inserted into the mouth and advanced under direct vision to second portion of the duodenum. A careful inspection was made as the gastroscope was withdrawn, including a retroflexed view of the proximal stomach; findings and interventions are described below. EGD Findings:  Esophagus: Mild, non-obstructing ring at GEJ without hiatal hernia. Trace amount of white plaques consistent with possible, very mild candidiasis vs glycogenic acanthoses vs adherent food. Dilation performed with 57 Fr dilator with moderate resistance, post-dilation inspection notable for proximal esophagus mucosal tearing and self-limited hemorrhage. No perforation. Stomach: normal; random biopsies obtained from antrum and body to exclude H pylori  Duodenum:juan; random biopsies obtained from duodenal bulb and second portion to exclude enteropathy      The bed was then turned and upon sequential sedation as per above, a digital rectal exam was performed per below. The Olympus videocolonoscope was inserted in the rectum and carefully advanced to the terminal ileum. The quality of preparation was excellent. Palmer Bowel Prep Score  3/3/3/9. The colonoscope was slowly withdrawn with careful evaluation between folds. Retroflexion in the rectum was performed.      Colon Findings: Normal mucosa; no polyps, no masses  Rectum: normal  Sigmoid: normal  Descending Colon: normal   Transverse Colon: normal  Ascending Colon: normal  Cecum: normal  Terminal Ileum: normal           Specimens Removed:    ID Type Source Tests Collected by Time Destination   1 : Duodenum Bx Preservative Duodenum  Maria Antonia Heredia MD 8/23/2022 1342 Pathology   2 : Gastric Bx Preservative Gastric  Maria Antonia Heredia MD 8/23/2022 1343 Pathology       Complications: None. EBL:  none    Impression:    See Postoperative diagnosis above    Recommendations:   - Await pathology evaluation of biopsy / resected tissue  - Resume normal medications. - Resume previous diet. - Will assess response to dilation in 10 business days; if no improvement, will discuss fluconazole treatment for minimal to mild candidiasis  - Repeat colonoscopy can be done in 10 years if the value of continuing screening or surveillance for colorectal pre-cancerous lesions and cancer outweighs risks of sedation and procedural complications. Discharge Disposition:  Home in the company of a  when able to ambulate.     Hermilo Acosta MD  8/23/2022  2:04 PM

## 2022-08-23 NOTE — ANESTHESIA POSTPROCEDURE EVALUATION
Procedure(s):  COLONOSCOPY  ESOPHAGOGASTRODUODENOSCOPY (EGD)  ESOPHAGOGASTRODUODENAL (EGD) BIOPSY  ESOPHAGEAL DILATION. MAC    Anesthesia Post Evaluation        Patient participation: complete - patient participated  Level of consciousness: awake  Pain management: adequate  Airway patency: patent  Anesthetic complications: no  Cardiovascular status: hemodynamically stable  Respiratory status: acceptable  Hydration status: acceptable  Comments: The patient is ready for PACU discharge. Deann Ng DO                   Post anesthesia nausea and vomiting:  controlled      INITIAL Post-op Vital signs:   Vitals Value Taken Time   BP 81/54 08/23/22 1410   Temp     Pulse 68 08/23/22 1416   Resp 19 08/23/22 1416   SpO2 85 % 08/23/22 1416   Vitals shown include unvalidated device data.

## 2022-08-23 NOTE — ANESTHESIA PREPROCEDURE EVALUATION
Anesthetic History   No history of anesthetic complications            Review of Systems / Medical History  Patient summary reviewed, nursing notes reviewed and pertinent labs reviewed    Pulmonary            Asthma        Neuro/Psych   Within defined limits           Cardiovascular    Hypertension              Exercise tolerance: >4 METS     GI/Hepatic/Renal     GERD           Endo/Other    Diabetes    Arthritis     Other Findings              Physical Exam    Airway  Mallampati: II  TM Distance: > 6 cm  Neck ROM: normal range of motion   Mouth opening: Normal     Cardiovascular  Regular rate and rhythm,  S1 and S2 normal,  no murmur, click, rub, or gallop             Dental  No notable dental hx       Pulmonary  Breath sounds clear to auscultation               Abdominal  GI exam deferred       Other Findings            Anesthetic Plan    ASA: 2  Anesthesia type: MAC          Induction: Intravenous  Anesthetic plan and risks discussed with: Patient

## 2022-08-23 NOTE — DISCHARGE INSTRUCTIONS
[address] [facility] Titi Zuniga MD  [phone]      August 23, 2022    Diane Thompson  YOB: 1955    COLONOSCOPY DISCHARGE INSTRUCTIONS    If there is redness at IV site you should apply warm compress to area. If redness or soreness persist contact Dr. Kristen Zuniga or your primary care doctor. There may be a slight amount of blood passed from the rectum. Gaseous discomfort may develop, but walking, belching will help relieve this. You may not operate a vehicle for 12 hours  You may not operate machinery or dangerous appliances for rest of today  You may not drink alcoholic beverages for 12 hours  Avoid making any critical decisions for 24 hours    DIET:  You may resume your normal diet, but some patients find that heavy or large meals may lead to indigestion or vomiting. I suggest a light meal as first food intake. MEDICATIONS:  The use of some over-the-counter pain medication may lead to bleeding after colon biopsies or polyp removal.  Tylenol (also called acetaminophen) is safe to take even if you have had colonoscopy with polyp removal.  Based on the procedure you can resume baby-dose aspirin (81 mg) and may safely take anticoagulation (like apixaban (Eliquis), dabigatran (Pradaxa), edoxaban (Anna Money), rivaroxaban (Xarelto) or warfarin (Coumadin)) or antiplatelet medications (high dose aspirin 325mg, Clopidogrel (Plavix), Prasugrel (Effient), Ticagrelor (Brilinta))for the next two (48 hours) days. ACTIVITY:  You may resume your normal household activities, but it is recommended that you spend the remainder of the day resting -  avoid any strenuous activity. CALL DR. BLANC'S OFFICE IF:  Increasing pain, nausea, vomiting  Abdominal distension (swelling)  Significant new or increased bleeding (oral or rectal)  Fever/Chills  Chest pain/shortness of breath                       Additional instructions:   No gross lesions to explain iron deficiency in the esophagus, stomach, duodenum, terminal ileum, or colon. Await biopsy results. Regarding swallowing symptoms, a dilation was performed today to help with this complaint. There was only a mild ring of scar tissue in the distal esophagus. You may have had some scar tissue in the top of your esophagus as after dilation was performed there was an appropriate (otherwise expected) amount of tearing in this area. Our office will contact you within 10 business days with your biopsy results and to assess if swallowing symptoms improved after dilation. If they haven't improved, there was a very small amount of plaques in the esophagus that may have been fungal buildup which can be treated by a medicine called fluconazole. We can try this if the dilation was not effective. It was an honor to be your doctor today. Please let me or my office staff know if you have any feedback about today's procedure. Floresita Landaverde MD, August 23, 2022    Colonoscopy saves lives, and can prevent colon cancer. Everyone aged 48 or older needs colonoscopy.   Tell your family and friends: get the test!

## 2024-06-20 ENCOUNTER — HOSPITAL ENCOUNTER (OUTPATIENT)
Facility: HOSPITAL | Age: 69
End: 2024-06-20
Attending: NEUROLOGICAL SURGERY
Payer: MEDICARE

## 2024-06-20 ENCOUNTER — HOSPITAL ENCOUNTER (OUTPATIENT)
Facility: HOSPITAL | Age: 69
Discharge: HOME | End: 2024-06-20
Attending: NEUROLOGICAL SURGERY
Payer: MEDICARE

## 2024-06-20 DIAGNOSIS — G95.9 MYELOPATHY (HCC): ICD-10-CM

## 2024-06-20 PROCEDURE — 72141 MRI NECK SPINE W/O DYE: CPT

## 2024-06-20 PROCEDURE — 72148 MRI LUMBAR SPINE W/O DYE: CPT

## 2024-06-20 PROCEDURE — 72146 MRI CHEST SPINE W/O DYE: CPT

## 2025-07-10 ENCOUNTER — APPOINTMENT (OUTPATIENT)
Facility: HOSPITAL | Age: 70
End: 2025-07-10
Payer: MEDICARE

## 2025-07-10 ENCOUNTER — HOSPITAL ENCOUNTER (EMERGENCY)
Facility: HOSPITAL | Age: 70
Discharge: HOME | End: 2025-07-10
Attending: STUDENT IN AN ORGANIZED HEALTH CARE EDUCATION/TRAINING PROGRAM
Payer: MEDICARE

## 2025-07-10 VITALS
RESPIRATION RATE: 15 BRPM | DIASTOLIC BLOOD PRESSURE: 65 MMHG | OXYGEN SATURATION: 99 % | BODY MASS INDEX: 40.56 KG/M2 | TEMPERATURE: 97.3 F | HEIGHT: 60 IN | SYSTOLIC BLOOD PRESSURE: 141 MMHG | HEART RATE: 85 BPM | WEIGHT: 206.57 LBS

## 2025-07-10 DIAGNOSIS — S00.03XA CONTUSION OF SCALP, INITIAL ENCOUNTER: ICD-10-CM
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
## 2025-07-10 PROCEDURE — 72125 CT NECK SPINE W/O DYE: CPT

## 2025-07-10 PROCEDURE — 99284 EMERGENCY DEPT VISIT MOD MDM: CPT

## 2025-07-10 PROCEDURE — 70450 CT HEAD/BRAIN W/O DYE: CPT

## 2025-07-10 ASSESSMENT — PAIN DESCRIPTION - DESCRIPTORS: DESCRIPTORS: ACHING

## 2025-07-10 ASSESSMENT — PAIN DESCRIPTION - FREQUENCY: FREQUENCY: CONTINUOUS

## 2025-07-10 ASSESSMENT — PAIN DESCRIPTION - LOCATION: LOCATION: HEAD

## 2025-07-10 ASSESSMENT — PAIN DESCRIPTION - ORIENTATION: ORIENTATION: POSTERIOR

## 2025-07-10 ASSESSMENT — PAIN - FUNCTIONAL ASSESSMENT: PAIN_FUNCTIONAL_ASSESSMENT: 0-10

## 2025-07-10 ASSESSMENT — PAIN SCALES - GENERAL: PAINLEVEL_OUTOF10: 3

## 2025-07-10 NOTE — ED TRIAGE NOTES
Pt presents to ED via [facility] c/o Fall. Pt reports she was at work and fell off rolling chair and hit back of head on filing cabinet . Denies LOC. Denies thinners. No active bleed but feels soreness to the back of head. Pain 3/10

## 2025-07-10 NOTE — ED PROVIDER NOTES
Abuse Father     Heart Failure Father     Depression Father     High Cholesterol Mother     Anesth Problems Neg Hx     Hypertension Father         Social History     Socioeconomic History    Marital status:    Tobacco Use    Smoking status: Former     Current packs/day: 0.00     Types: Cigarettes     Quit date: 1975     Years since quittin.5    Smokeless tobacco: Never   Substance and Sexual Activity    Alcohol use: Yes    Drug use: No   Social History Narrative    Lives in [place] [facility] With son        Current Medications: Reviewed in chart.    Allergies:   Allergies   Allergen Reactions    Atorvastatin Other (See Comments)     Patient takes simvastatin    Paroxetine Hcl Other (See Comments)     tachycardia    Adhesive Tape Rash          REVIEW OF SYSTEMS: See HPI for pertinent positives and negatives.      PHYSICAL EXAM:  BP (!) 172/80   Pulse 85   Temp 97.3 °F (36.3 °C) (Oral)   Resp 15   Ht 1.524 m (5')   Wt 93.7 kg (206 lb 9.1 oz)   SpO2 99%   BMI 40.34 kg/m²    Physical Exam  Vitals and nursing note reviewed.   Constitutional:       Appearance: Normal appearance.   HENT:      Head: Normocephalic.        Right Ear: External ear normal.      Left Ear: External ear normal.      Nose: Nose normal.   Eyes:      Conjunctiva/sclera: Conjunctivae normal.   Cardiovascular:      Rate and Rhythm: Normal rate and regular rhythm.      Pulses: Normal pulses.   Pulmonary:      Effort: Pulmonary effort is normal.      Breath sounds: Normal breath sounds.   Abdominal:      Palpations: Abdomen is soft.      Tenderness: There is no abdominal tenderness.   Musculoskeletal:         General: No tenderness. Normal range of motion.   Skin:     General: Skin is warm and dry.      Coloration: Skin is not jaundiced.   Neurological:      General: No focal deficit present.      Mental Status: She is alert and oriented to person, place, and time.   Psychiatric:         Mood and Affect: Mood normal.         Behavior:

## 2025-07-10 NOTE — ED NOTES
Patient stable at time of discharge. Reviewed discharge instructions and home care with patient. Allowed time for questions. Patient verbalized understanding. Allowed time for questions. Patient ambulatory out of the department with steady gait unaccompanied.

## 2025-07-10 NOTE — DISCHARGE INSTRUCTIONS
You presented the ED after mechanical fall striking the back of your head.  CT imaging the head and neck showed no acute fractures head bleeds.  No change to your ventriculomegaly.  Recommend Tylenol for headache.  Symptoms change or worsen return to the ED otherwise follow-up with your PCP.

## (undated) DEVICE — Device

## (undated) DEVICE — FORCEPS BX L240CM JAW DIA2.8MM L CAP W/ NDL MIC MESH TOOTH

## (undated) DEVICE — TUBING HYDR IRR --